# Patient Record
Sex: FEMALE | Race: NATIVE HAWAIIAN OR OTHER PACIFIC ISLANDER | URBAN - NONMETROPOLITAN AREA
[De-identification: names, ages, dates, MRNs, and addresses within clinical notes are randomized per-mention and may not be internally consistent; named-entity substitution may affect disease eponyms.]

---

## 2017-01-13 ENCOUNTER — OFFICE VISIT - GICH (OUTPATIENT)
Dept: FAMILY MEDICINE | Facility: OTHER | Age: 37
End: 2017-01-13

## 2017-01-13 ENCOUNTER — HISTORY (OUTPATIENT)
Dept: FAMILY MEDICINE | Facility: OTHER | Age: 37
End: 2017-01-13

## 2017-01-13 DIAGNOSIS — L74.510 PRIMARY FOCAL HYPERHIDROSIS OF AXILLA: ICD-10-CM

## 2017-01-13 DIAGNOSIS — F41.1 GENERALIZED ANXIETY DISORDER: ICD-10-CM

## 2017-01-13 DIAGNOSIS — Z00.00 ENCOUNTER FOR GENERAL ADULT MEDICAL EXAMINATION WITHOUT ABNORMAL FINDINGS: ICD-10-CM

## 2017-01-13 ASSESSMENT — ANXIETY QUESTIONNAIRES
3. WORRYING TOO MUCH ABOUT DIFFERENT THINGS: NEARLY EVERY DAY
7. FEELING AFRAID AS IF SOMETHING AWFUL MIGHT HAPPEN: MORE THAN HALF THE DAYS
6. BECOMING EASILY ANNOYED OR IRRITABLE: SEVERAL DAYS
2. NOT BEING ABLE TO STOP OR CONTROL WORRYING: NEARLY EVERY DAY
5. BEING SO RESTLESS THAT IT IS HARD TO SIT STILL: NOT AT ALL
4. TROUBLE RELAXING: SEVERAL DAYS
GAD7 TOTAL SCORE: 12
1. FEELING NERVOUS, ANXIOUS, OR ON EDGE: MORE THAN HALF THE DAYS

## 2017-01-15 ENCOUNTER — HISTORY (OUTPATIENT)
Dept: FAMILY MEDICINE | Facility: OTHER | Age: 37
End: 2017-01-15

## 2017-09-19 ENCOUNTER — HISTORY (OUTPATIENT)
Dept: FAMILY MEDICINE | Facility: OTHER | Age: 37
End: 2017-09-19

## 2017-09-19 ENCOUNTER — PRENATAL OFFICE VISIT - GICH (OUTPATIENT)
Dept: FAMILY MEDICINE | Facility: OTHER | Age: 37
End: 2017-09-19

## 2017-09-19 DIAGNOSIS — Z98.891 HISTORY OF UTERINE SCAR DUE TO PREVIOUS SURGERY: ICD-10-CM

## 2017-09-19 DIAGNOSIS — O09.529 SUPERVISION OF ELDERLY MULTIGRAVIDA: ICD-10-CM

## 2017-09-19 LAB
ABO + RH BLD: NORMAL
ABO + RH BLD: NORMAL
ABORH - HISTORICAL: NORMAL
ABSOLUTE BASOPHILS - HISTORICAL: 0 THOU/CU MM
ABSOLUTE EOSINOPHILS - HISTORICAL: 0.2 THOU/CU MM
ABSOLUTE IMMATURE GRANULOCYTES(METAS,MYELOS,PROS) - HISTORICAL: 0 THOU/CU MM
ABSOLUTE LYMPHOCYTES - HISTORICAL: 2.3 THOU/CU MM (ref 0.9–2.9)
ABSOLUTE MONOCYTES - HISTORICAL: 0.8 THOU/CU MM
ABSOLUTE NEUTROPHILS - HISTORICAL: 6.5 THOU/CU MM (ref 1.7–7)
ANTIBODY SCREEN - HISTORICAL: NEGATIVE
BACTERIA URINE: NORMAL BACTERIA/HPF
BASOPHILS # BLD AUTO: 0.4 %
BILIRUB UR QL: NEGATIVE
CLARITY, URINE: CLEAR CLARITY
COLOR UR: YELLOW COLOR
EOSINOPHIL NFR BLD AUTO: 2.2 %
EPITHELIAL CELLS: NORMAL EPI/HPF
ERYTHROCYTE [DISTWIDTH] IN BLOOD BY AUTOMATED COUNT: 14.6 % (ref 11.5–15.5)
GLUCOSE URINE: NEGATIVE MG/DL
HCG UR QL: POSITIVE
HCT VFR BLD AUTO: 40.9 % (ref 33–51)
HEMOGLOBIN: 13.4 G/DL (ref 12–16)
IMMATURE GRANULOCYTES(METAS,MYELOS,PROS) - HISTORICAL: 0.3 %
KETONES UR QL: NEGATIVE MG/DL
LEUKOCYTE ESTERASE URINE: ABNORMAL
LYMPHOCYTES NFR BLD AUTO: 23 % (ref 20–44)
MCH RBC QN AUTO: 26.5 PG (ref 26–34)
MCHC RBC AUTO-ENTMCNC: 32.8 G/DL (ref 32–36)
MCV RBC AUTO: 81 FL (ref 80–100)
MONOCYTES NFR BLD AUTO: 8 %
NEUTROPHILS NFR BLD AUTO: 66.1 % (ref 42–72)
NITRITE UR QL STRIP: NEGATIVE
OCCULT BLOOD,URINE - HISTORICAL: ABNORMAL
PH UR: 7 [PH]
PLATELET # BLD AUTO: 372 THOU/CU MM (ref 140–440)
PMV BLD: 10.4 FL (ref 6.5–11)
PROTEIN QUALITATIVE,URINE - HISTORICAL: NEGATIVE MG/DL
RBC - HISTORICAL: NORMAL /HPF
RED BLOOD COUNT - HISTORICAL: 5.05 MIL/CU MM (ref 4–5.2)
RUBELLA COMMENT - HISTORICAL: NORMAL
SP GR UR STRIP: <=1.005
SPECIMEN EXPIRATION DATE/TIME - HISTORICAL: NORMAL
UROBILINOGEN,QUALITATIVE - HISTORICAL: NORMAL EU/DL
WBC - HISTORICAL: NORMAL /HPF
WHITE BLOOD COUNT - HISTORICAL: 9.9 THOU/CU MM (ref 4.5–11)

## 2017-09-19 ASSESSMENT — ANXIETY QUESTIONNAIRES
2. NOT BEING ABLE TO STOP OR CONTROL WORRYING: MORE THAN HALF THE DAYS
5. BEING SO RESTLESS THAT IT IS HARD TO SIT STILL: MORE THAN HALF THE DAYS
4. TROUBLE RELAXING: MORE THAN HALF THE DAYS
6. BECOMING EASILY ANNOYED OR IRRITABLE: MORE THAN HALF THE DAYS
GAD7 TOTAL SCORE: 12
1. FEELING NERVOUS, ANXIOUS, OR ON EDGE: SEVERAL DAYS
7. FEELING AFRAID AS IF SOMETHING AWFUL MIGHT HAPPEN: SEVERAL DAYS
3. WORRYING TOO MUCH ABOUT DIFFERENT THINGS: MORE THAN HALF THE DAYS

## 2017-09-19 ASSESSMENT — PATIENT HEALTH QUESTIONNAIRE - PHQ9: SUM OF ALL RESPONSES TO PHQ QUESTIONS 1-9: 3

## 2017-09-20 ENCOUNTER — COMMUNICATION - GICH (OUTPATIENT)
Dept: FAMILY MEDICINE | Facility: OTHER | Age: 37
End: 2017-09-20

## 2017-09-20 DIAGNOSIS — Z33.1 PREGNANT STATE, INCIDENTAL: ICD-10-CM

## 2017-09-20 LAB
HBSAG CATEGORY - HISTORICAL: NONREACTIVE
HIV-1/HIV-2 ANTIBODY CATEGORY - HISTORICAL: NORMAL

## 2017-09-21 LAB
CULTURE - HISTORICAL: NORMAL
TREPONEMA PALLIDUM - HISTORICAL: NEGATIVE

## 2017-09-25 ENCOUNTER — HOSPITAL ENCOUNTER (OUTPATIENT)
Dept: RADIOLOGY | Facility: OTHER | Age: 37
End: 2017-09-25
Attending: FAMILY MEDICINE

## 2017-09-25 ENCOUNTER — AMBULATORY - GICH (OUTPATIENT)
Dept: FAMILY MEDICINE | Facility: OTHER | Age: 37
End: 2017-09-25

## 2017-09-25 DIAGNOSIS — Z98.891 HISTORY OF UTERINE SCAR DUE TO PREVIOUS SURGERY: ICD-10-CM

## 2017-09-25 DIAGNOSIS — O09.529 SUPERVISION OF ELDERLY MULTIGRAVIDA: ICD-10-CM

## 2017-09-26 ENCOUNTER — HISTORY (OUTPATIENT)
Dept: FAMILY MEDICINE | Facility: OTHER | Age: 37
End: 2017-09-26

## 2017-09-26 ENCOUNTER — AMBULATORY - GICH (OUTPATIENT)
Dept: FAMILY MEDICINE | Facility: OTHER | Age: 37
End: 2017-09-26

## 2017-09-26 DIAGNOSIS — O30.049 DICHORIONIC DIAMNIOTIC TWIN PREGNANCY: ICD-10-CM

## 2017-09-27 ENCOUNTER — AMBULATORY - GICH (OUTPATIENT)
Dept: FAMILY MEDICINE | Facility: OTHER | Age: 37
End: 2017-09-27

## 2017-09-27 DIAGNOSIS — O09.529 SUPERVISION OF ELDERLY MULTIGRAVIDA: ICD-10-CM

## 2017-09-27 DIAGNOSIS — O30.041 DICHORIONIC DIAMNIOTIC TWIN PREGNANCY IN FIRST TRIMESTER: ICD-10-CM

## 2017-10-09 ENCOUNTER — HISTORY (OUTPATIENT)
Dept: FAMILY MEDICINE | Facility: OTHER | Age: 37
End: 2017-10-09

## 2017-10-09 ENCOUNTER — PRENATAL OFFICE VISIT - GICH (OUTPATIENT)
Dept: FAMILY MEDICINE | Facility: OTHER | Age: 37
End: 2017-10-09

## 2017-10-09 DIAGNOSIS — Z98.891 HISTORY OF UTERINE SCAR DUE TO PREVIOUS SURGERY: ICD-10-CM

## 2017-10-09 DIAGNOSIS — L74.510 PRIMARY FOCAL HYPERHIDROSIS OF AXILLA: ICD-10-CM

## 2017-10-09 DIAGNOSIS — O09.529 SUPERVISION OF ELDERLY MULTIGRAVIDA: ICD-10-CM

## 2017-10-09 DIAGNOSIS — O30.041 DICHORIONIC DIAMNIOTIC TWIN PREGNANCY IN FIRST TRIMESTER: ICD-10-CM

## 2017-10-20 ENCOUNTER — PRENATAL OFFICE VISIT - GICH (OUTPATIENT)
Dept: OBGYN | Facility: OTHER | Age: 37
End: 2017-10-20

## 2017-10-20 ENCOUNTER — HISTORY (OUTPATIENT)
Dept: OBGYN | Facility: OTHER | Age: 37
End: 2017-10-20

## 2017-10-20 DIAGNOSIS — O09.529 SUPERVISION OF ELDERLY MULTIGRAVIDA: ICD-10-CM

## 2017-10-20 DIAGNOSIS — O30.041 DICHORIONIC DIAMNIOTIC TWIN PREGNANCY IN FIRST TRIMESTER: ICD-10-CM

## 2017-10-20 ASSESSMENT — PATIENT HEALTH QUESTIONNAIRE - PHQ9: SUM OF ALL RESPONSES TO PHQ QUESTIONS 1-9: 4

## 2017-11-21 ENCOUNTER — HISTORY (OUTPATIENT)
Dept: OBGYN | Facility: OTHER | Age: 37
End: 2017-11-21

## 2017-11-21 ENCOUNTER — PRENATAL OFFICE VISIT - GICH (OUTPATIENT)
Dept: OBGYN | Facility: OTHER | Age: 37
End: 2017-11-21

## 2017-11-21 DIAGNOSIS — O30.042 DICHORIONIC DIAMNIOTIC TWIN PREGNANCY IN SECOND TRIMESTER: ICD-10-CM

## 2017-11-21 DIAGNOSIS — Z98.891 HISTORY OF UTERINE SCAR DUE TO PREVIOUS SURGERY: ICD-10-CM

## 2017-11-21 DIAGNOSIS — O09.529 SUPERVISION OF ELDERLY MULTIGRAVIDA: ICD-10-CM

## 2017-12-27 NOTE — PROGRESS NOTES
Patient Information     Patient Name MRN Sex Dafne Wright [id number] Female 1980      Progress Notes by Tu Mejia MD at 2017  3:15 PM     Author:  Tu Mejia MD Service:  (none) Author Type:  Physician     Filed:  2017  4:49 PM Encounter Date:  2017 Status:  Signed     :  Tu Mejia MD (Physician)            [facility]    Problem List:  Estimated Date of Delivery: 18  Hx of LTCS for fetal intolerance of labor  Twin gestation, (di/di)  Clinically significant anxiety    OB History                    Para  Term     AB  Living     2   1  1       1     SAB   TAB  Ectopic  Multiple   Live Births                 1                        # Outcome Date  GA  Lbr Frank/2nd  Weight Sex  Delivery Anes PTL Lv   2 Current                1 Term 16  40w0d    3.51 kg (7 lb 11.8 oz) M   SPINAL  DANNY      Complications: Fetal Intolerance                                    Immunization History     Administered  Date(s) Administered     AMB Influenza, IIV4 PF (=>6 mos Flulaval;=>3 yrs Fluzone Fluarix)(Flu Clinic Only) 2016     Influenza, IIV4 2015, 2017     Tdap 2016       O Rh Positive  28 week labs:*  Tdap*  Flu:done  GBS:*    S:  NO fetal movement yet, moods OK, some bloating, BM is OK.    O: /82  Pulse 78  Wt 75.6 kg (166 lb 9.6 oz)  LMP 2017  BMI 27.72 kg/m2  See flowsheet    A/P:  Dafne Altman is a 37 y.o.  at 16w4d early , here for return OB visit  Fetal survey and follow up in one month.  Anxiety and impression is better/stable.    RTC one month.

## 2017-12-27 NOTE — PROGRESS NOTES
Patient Information     Patient Name MRN Sex Dafne Wright [id number] Female 1980      Progress Notes by Milagro Sibley MD at 10/9/2017  3:00 PM     Author:  Milagro Sibley MD Service:  (none) Author Type:  Physician     Filed:  10/9/2017  5:24 PM Encounter Date:  10/9/2017 Status:  Signed     :  Milagro Sibley MD (Physician)            Pt with twin gestation.  Discussion regarding pregnancy management with OBGYN.  She will be scheduling with Tu Mejia MD.    She denies nausea.  Has had some intermittent constipation.  Is irritable, at times furious, more frequent crying.    Flu vaccine done at last visit.  Pt with previous , request tubal ligation.  Had declined AMA testing.  No bleeding/spotting or discharge.  O Rh Positive  HEMOGLOBIN                (g/dL)    Date Value   2017 13.4   May need follow up ultrasound to reconfirm viable twins - at least one heart beat audible today.  Milagro Sibley MD

## 2017-12-27 NOTE — PROGRESS NOTES
"Patient Information     Patient Name MRN Sex     Dafne Altman [id number] Female 1980      Progress Notes by Tu Mejia MD at 10/20/2017  3:00 PM     Author:  Tu Mejia MD Service:  (none) Author Type:  Physician     Filed:  10/20/2017  4:59 PM Encounter Date:  10/20/2017 Status:  Signed     :  Tu Mejia MD (Physician)            [facility]  OB Consult requested by Dr. Sibley    Problem List:  Estimated Date of Delivery: 18  Hx of LTCS for fetal intolerance of labor  Twin gestation, (di/di)  Clinically significant anxiety    OB History                    Para  Term     AB  Living     2   1  1       1     SAB   TAB  Ectopic  Multiple   Live Births                 1                        # Outcome Date  GA  Lbr Frank/2nd  Weight Sex  Delivery Anes PTL Lv   2 Current                1 Term 16  40w0d    3.51 kg (7 lb 11.8 oz) M   SPINAL  DANNY      Complications: Fetal Intolerance                                    Immunization History     Administered  Date(s) Administered     AMB Influenza, IIV4 PF (=>6 mos Flulaval;=>3 yrs Fluzone Fluarix)(Flu Clinic Only) 2016     Influenza, IIV4 2015, 2017     Tdap 2016       O Rh Positive  28 week labs:*  Tdap*  Flu:done  GBS:*    S: Patient reports she has been feeling sad, anxious and depressed. She states she doesn't like pregnancy or motherhood and feels overwhelmed. She declines anxiety or depression meds today and denies suicidality. She denies cramping, contractions, vaginal bleeding, leaking fluid. She reports no normal fetal movement yet. She has no other complaints.    O: /80  Pulse 74  Ht 1.651 m (5' 5\")  Wt 71.7 kg (158 lb)  LMP 2017  BMI 26.29 kg/m2  See flowsheet    Bedside US demonstrates a viable twin IUP, dichorionic and diamnionic measruing 11w6d by CRL on both Twin A and B. Both fetal heart rates in the 160's.    A/P:  Dafne Altman is a 37 y.o. "  at 12w0d by early US, here for return OB visit/transfer of care to OB.    RTC one month.  Discussed risks of twin IUP compared to [facility] including  birth, hypertension, DM in pregnancy, and poor  outcome. Will plan for 37-38 week repeat . She is requesting tubal ligation at the time of her . Will consent for this around 32 weeks. Discussed weight gain, and potentially starting an antidepressant. Discussed supplementing additional folic acid at 1-2 mg daily, and ferrous sulfate 325 mg daily in addition to her PNV. She is declining aneuploidy screening, CF, and SMA screening. Plan for monthly growth US from 20 weeks and beyond. Discussed the nature of our group practice and that she will need to be managed in OB/Gyn with myself or Riccardo Hutchinson or Juan M. She will Dr. Sibley for her twins as the  doctor.  Tu Mejia MD FACOG  4:50 PM 10/20/2017

## 2017-12-28 NOTE — TELEPHONE ENCOUNTER
Patient Information     Patient Name MRN Sex Dafne Wright [id number] Female 1980      Telephone Encounter by Milagro Sibley MD at 2017 12:40 PM     Author:  Milagro Sibley MD Service:  (none) Author Type:  Physician     Filed:  2017 12:46 PM Encounter Date:  2017 Status:  Signed     :  Milagro Sibley MD (Physician)            Discussed OBGYN consult due to twin gestation.  Pt is aware.  Has appointment with Milagro Sibley MD early next month.  Milagro Sibley MD

## 2017-12-28 NOTE — TELEPHONE ENCOUNTER
Patient Information     Patient Name MRN Dafne Bates [id number] Female 1980      Telephone Encounter by Mayra Paulson at 2017 10:37 AM     Author:  Mayra Paulson Service:  (none) Author Type:  (none)     Filed:  2017 10:40 AM Encounter Date:  2017 Status:  Signed     :  Mayra Paulson            Patient is wondering if she could get a prescription for prenatal vitamins sent to pharmacy. Previous prescription for vitamins is ryan'd up below.      Mayra Paulson ....................  2017   10:39 AM

## 2017-12-30 NOTE — NURSING NOTE
Patient Information     Patient Name MRN Dafne Bates [id number] Female 1980      Nursing Note by Mayra Paulson at 10/9/2017  3:00 PM     Author:  Mayra Paulson Service:  (none) Author Type:  (none)     Filed:  10/9/2017  3:13 PM Encounter Date:  10/9/2017 Status:  Signed     :  Mayra Paulson            Patient presents to the clinic today for a OB check, she is 10w3d.    Mayra Paulson ....................  10/9/2017   2:58 PM

## 2017-12-30 NOTE — NURSING NOTE
Patient Information     Patient Name MRN Dafne Bates [id number] Female 1980      Nursing Note by Mayra Paulson at 2017  2:45 PM     Author:  Mayra Paulson Service:  (none) Author Type:  (none)     Filed:  2017  3:00 PM Encounter Date:  2017 Status:  Signed     :  Mayra Paulson            Patient presents to the clinic today for an OB px.    Mayra Paulson ....................  2017   2:47 PM

## 2018-01-02 ENCOUNTER — HISTORY (OUTPATIENT)
Dept: OBGYN | Facility: OTHER | Age: 38
End: 2018-01-02

## 2018-01-02 ENCOUNTER — PRENATAL OFFICE VISIT - GICH (OUTPATIENT)
Dept: OBGYN | Facility: OTHER | Age: 38
End: 2018-01-02

## 2018-01-02 ENCOUNTER — HOSPITAL ENCOUNTER (OUTPATIENT)
Dept: RADIOLOGY | Facility: OTHER | Age: 38
End: 2018-01-02
Attending: OBSTETRICS & GYNECOLOGY

## 2018-01-02 DIAGNOSIS — L74.510 PRIMARY FOCAL HYPERHIDROSIS OF AXILLA: ICD-10-CM

## 2018-01-02 DIAGNOSIS — O30.042 DICHORIONIC DIAMNIOTIC TWIN PREGNANCY IN SECOND TRIMESTER: ICD-10-CM

## 2018-01-02 DIAGNOSIS — O09.529 SUPERVISION OF ELDERLY MULTIGRAVIDA: ICD-10-CM

## 2018-01-03 NOTE — PROGRESS NOTES
Patient Information     Patient Name MRN Dafne Bates [id number] Female 1980      Progress Notes by Milagro Sibley MD at 2017  3:00 PM     Author:  Milagro Sibley MD Service:  (none) Author Type:  Physician     Filed:  1/15/2017 10:38 AM Encounter Date:  2017 Status:  Signed     :  Milagro Sibley MD (Physician)            SUBJECTIVE:    Dafne Altman is a 36 y.o. female who presehts for her annual exam.    HPI: Dafne Altman is a 36 y.o. female presents for her annual exam.    Last pap:   Immunizations:  utd  Mammogram at age 44  Colon cancer screening at age 49  Current birth control stopped her oral contraceptive pills  - states not having sex often.  Sleeps in son's room.  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     PAST MEDICAL HISTORY:  Past Medical History      Diagnosis   Date     Hyperhidrosis of axilla  2017     Postpartum anxiety  5/15/2016     PPD positive, treated       Pregnancy, high-risk             SURGICAL HISTORY:  Past Surgical History       Procedure   Laterality Date     Cystectomy  Left      Axillary area       Colonoscopy         section, low transverse   3/29/2016     recurrent decels         SOCIAL HISTORY:  Social History     Social History        Marital status:       Spouse name: González     Number of children:  1     Years of education:  16     Occupational History          [employer]     works remotely      Social History Main Topics          Smoking status:   Never Smoker      Smokeless tobacco:   Never Used      Alcohol use   2.4 oz/week     4 Standard drinks or equivalent per week        Comment:  often in single episode       Drug use:   No      Sexual activity:   Yes      Partners:  Male      Birth control/ protection:  None      Other Topics  Concern     Not on file      Social History Narrative     She was born in the [place] and has lived in the states for 23 years.         She works for [employer] from home. She works with the [employer].   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        Son González Chaudhary born 3/2016          .      FAMILY HISTORY:  Family History       Problem   Relation Age of Onset     Diabetes  Mother      Hypertension  Father      Other  Sister      PCOS       Diabetes  Sister      Diabetes  Sister       CURRENT MEDICATIONS:  None   Current Outpatient Prescriptions       Medication  Sig Dispense Refill             No current facility-administered medications for this visit.      Medications have been reviewed by me and are current to the best of my knowledge and ability.    ALLERGIES:  Review of patient's allergies indicates no known allergies.     REVIEW OF SYSTEMS:  General: stressed  Eyes: denies problems  Ears/Nose/Throat: denies problems, last dentist visit was last summer  Respiratory: denies problems  Cardiovascular: denies problems  Gastrointestinal: denies problems  Genitourinary: off of oral contraceptive pills  - states not having sex often  Musculoskeletal: denies problems  Skin: states at the start of her work day she immediately starts sweating profusely - this has been for years.  This is embarrassing, even though she works at home.  Neurologic: denies problems  Psychiatric: she's had anxiety for years; while working she sweats, chews the inside of her cheek; doesn't feel depressed;  works long and extra shifts - she doesn't feel like she has lots of support; finding reliable  has been a struggle; doesn't get regular time to exercise  In the past she's been on Buspar, Effexor,  "and Wellbutrin - also said she took chantix once and had a \"psyhotic episode\"; was on celexa - didn't feel as if it helped much and it made her want to eat all the time.  She and  are not communicating well.  She is drinking more often - intentionally to numb her feelings - this is about once a week.  Endocrine: denies problems; previously negative thyroid testing.  Heme/Lymphatic: denies problems  Allergic/Immunologic: denies problems    PHQ Depression Screening 1/13/2017   Date of PHQ exam (doc flow) 1/13/2017   1. Lack of interest/pleasure 0 - Not at all   2. Feeling down/depressed 0 - Not at all   PHQ-2 TOTAL SCORE 0   3. Trouble sleeping -   4. Decreased energy -   5. Appetite change -   6. Feelings of failure -   7. Trouble concentrating -   8. Activity level -   9. Hurting yourself -   PHQ-9 TOTAL SCORE -   PHQ-9 Severity Level -   Functional Impairment -      RITCHIE-7 ANXIETY SCREENING 1/13/2017   RITCHIE date (doc flow) 1/13/2017   Nervous, anxious 2   Cannot stop worrying 3   Worry about different things 3   Cannot relax 1   Feeling restless 0   Easily annoyed/irritated 1   Afraid of awful event 2   Score 12   Severity moderate anxiety       OBJECTIVE:  /80  Pulse 72  Ht 1.651 m (5' 5\")  Wt 69.9 kg (154 lb)  LMP 12/27/2016  BMI 25.63 kg/m2   Wt Readings from Last 3 Encounters:    01/13/17 69.9 kg (154 lb)   05/10/16 68.5 kg (151 lb)   04/12/16 70.5 kg (155 lb 6.4 oz)       EXAM:   General Appearance: Pleasant, alert, appropriate appearance for age. No acute distress  Head Exam: Normal. Normocephalic, atraumatic.  Eye Exam:  Orbital prominence, unchanged  Ear Exam: Normal TM's bilaterally. Normal auditory canals and external ears. Non-tender.  Nose Exam: Normal external nose, mucus membranes, and septum.  OroPharynx Exam:  Dental hygiene adequate. Normal buccal mucose. Normal pharynx.  Neck Exam:  Supple, no masses or nodes.  Thyroid Exam: No nodules or enlargement.  Chest/Respiratory Exam: Normal " chest wall and respirations. Clear to auscultation.  Breast Exam: No dimpling, nipple retraction or discharge. No masses or nodes.  Cardiovascular Exam: Regular rate and rhythm. S1, S2, no murmur, click, gallop, or rubs.  Gastrointestinal Exam: Soft, non-tender, no masses or organomegaly  Musculoskeletal Exam: Back is straight and non-tender, full ROM of upper and lower extremities.  Foot Exam: Left and right foot: good pedal pulses, no lesions, nail hygiene good.  Skin: no rash or abnormalities  Neurologic Exam: Nonfocal, symmetric DTRs, normal gross motor, tone coordination and no tremor.  Psychiatric Exam: tearful, tense    No results found for this visit on 01/13/17.    ASSESSMENT/PLAN    ICD-10-CM    1. Physical exam, annual Z00.00    2. 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 Generalized anxiety disorder F41.1      Ms. Dons Body mass index is 25.63 kg/(m^2). This is out of the normal range for a 36 y.o. Normal range for ages 18-64 is between 18.5 and 24.9; normal range for ages 65+ is 23-30. To lose weight we reviewed risks and benefits of appropriate options such as diet, exercise, and medications. Patient's strategy will be  none; patient is not ready to act  BP Readings from Last 1 Encounters:01/13/17 : 134/80  Ms. Carias blood pressure is out of the normal range for adults. Per JNC-8 guidelines normal adult blood pressure is < 120/80, pre-hypertensive is between 120/80 and 139/89, and hypertension is 140/90 or greater. Risks of hypertension were discussed. Patient's strategy will be to recheck blood pressure in 12 months    1.  Immunizations and HCM are up to date.  2.  Prescription for drysol to help with sweating.  3.  Discussion regarding mood and anxiety symptoms.  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  Couples counseling is  strongly recommended at this time.  I also recommended no alcohol use as she is currently using this in a potentially harmful manner.  She declines medication for anxiety at this time - I feel that some home dynamic changes would be more beneficial to medication treatment too.    Encouraged follow up in 2-3 months.  Milagro Sibley MD

## 2018-01-03 NOTE — NURSING NOTE
Patient Information     Patient Name MRN Dafne Bates [id number] Female 1980      Nursing Note by Heather Rojas at 2017  3:00 PM     Author:  Heather Rojas Service:  (none) Author Type:  (none)     Filed:  2017  3:33 PM Encounter Date:  2017 Status:  Signed     :  Heather Rojas            Patient here for yearly physical  Heather Rojsa LPN..............................2017  3:10 PM

## 2018-01-23 ENCOUNTER — COMMUNICATION - GICH (OUTPATIENT)
Dept: OBGYN | Facility: OTHER | Age: 38
End: 2018-01-23

## 2018-01-26 VITALS
HEART RATE: 78 BPM | SYSTOLIC BLOOD PRESSURE: 132 MMHG | DIASTOLIC BLOOD PRESSURE: 82 MMHG | WEIGHT: 166.6 LBS | BODY MASS INDEX: 27.72 KG/M2

## 2018-01-26 VITALS
DIASTOLIC BLOOD PRESSURE: 80 MMHG | HEART RATE: 76 BPM | BODY MASS INDEX: 26.02 KG/M2 | HEIGHT: 65 IN | SYSTOLIC BLOOD PRESSURE: 122 MMHG | WEIGHT: 157 LBS | WEIGHT: 156.2 LBS | HEIGHT: 65 IN | DIASTOLIC BLOOD PRESSURE: 76 MMHG | SYSTOLIC BLOOD PRESSURE: 126 MMHG | BODY MASS INDEX: 26.16 KG/M2

## 2018-01-26 VITALS
WEIGHT: 158 LBS | WEIGHT: 154 LBS | HEIGHT: 65 IN | SYSTOLIC BLOOD PRESSURE: 128 MMHG | SYSTOLIC BLOOD PRESSURE: 134 MMHG | HEIGHT: 65 IN | DIASTOLIC BLOOD PRESSURE: 80 MMHG | BODY MASS INDEX: 25.66 KG/M2 | HEART RATE: 74 BPM | HEART RATE: 72 BPM | DIASTOLIC BLOOD PRESSURE: 80 MMHG | BODY MASS INDEX: 26.33 KG/M2

## 2018-01-30 ENCOUNTER — PRENATAL OFFICE VISIT - GICH (OUTPATIENT)
Dept: OBGYN | Facility: OTHER | Age: 38
End: 2018-01-30

## 2018-01-30 ENCOUNTER — HISTORY (OUTPATIENT)
Dept: OBGYN | Facility: OTHER | Age: 38
End: 2018-01-30

## 2018-01-30 ENCOUNTER — HOSPITAL ENCOUNTER (OUTPATIENT)
Dept: RADIOLOGY | Facility: OTHER | Age: 38
End: 2018-01-30
Attending: OBSTETRICS & GYNECOLOGY

## 2018-01-30 DIAGNOSIS — O30.042 DICHORIONIC DIAMNIOTIC TWIN PREGNANCY IN SECOND TRIMESTER: ICD-10-CM

## 2018-01-30 DIAGNOSIS — Z34.92 ENCOUNTER FOR SUPERVISION OF NORMAL PREGNANCY IN SECOND TRIMESTER: ICD-10-CM

## 2018-01-31 ASSESSMENT — ANXIETY QUESTIONNAIRES
GAD7 TOTAL SCORE: 12
GAD7 TOTAL SCORE: 12

## 2018-01-31 ASSESSMENT — PATIENT HEALTH QUESTIONNAIRE - PHQ9
SUM OF ALL RESPONSES TO PHQ QUESTIONS 1-9: 4
SUM OF ALL RESPONSES TO PHQ QUESTIONS 1-9: 3

## 2018-02-04 ENCOUNTER — HEALTH MAINTENANCE LETTER (OUTPATIENT)
Age: 38
End: 2018-02-04

## 2018-02-07 ENCOUNTER — DOCUMENTATION ONLY (OUTPATIENT)
Dept: FAMILY MEDICINE | Facility: OTHER | Age: 38
End: 2018-02-07

## 2018-02-07 PROBLEM — L74.510 HYPERHIDROSIS OF AXILLA: Status: ACTIVE | Noted: 2017-01-13

## 2018-02-07 PROBLEM — O09.529 HIGH-RISK PREGNANCY, MULTIGRAVIDA OF ADVANCED MATERNAL AGE, ANTEPARTUM: Status: ACTIVE | Noted: 2017-09-19

## 2018-02-07 PROBLEM — O30.049 TWIN GESTATION, DICHORIONIC DIAMNIOTIC: Status: ACTIVE | Noted: 2017-01-01

## 2018-02-07 PROBLEM — Z98.891 HISTORY OF CESAREAN SECTION: Status: ACTIVE | Noted: 2017-09-19

## 2018-02-07 RX ORDER — FERROUS SULFATE 325(65) MG
1 TABLET, DELAYED RELEASE (ENTERIC COATED) ORAL DAILY
COMMUNITY
Start: 2018-01-02

## 2018-02-07 RX ORDER — FOLIC ACID 1 MG/1
1 TABLET ORAL DAILY
COMMUNITY
Start: 2018-01-02

## 2018-02-07 RX ORDER — PRENATAL VIT/IRON FUM/FOLIC AC 27MG-0.8MG
1 TABLET ORAL DAILY
COMMUNITY
Start: 2017-09-20

## 2018-02-08 DIAGNOSIS — O09.529 HIGH-RISK PREGNANCY, MULTIGRAVIDA OF ADVANCED MATERNAL AGE, ANTEPARTUM: Primary | ICD-10-CM

## 2018-02-08 DIAGNOSIS — O09.529 SUPERVISION OF HIGH-RISK PREGNANCY OF ELDERLY MULTIGRAVIDA: Primary | ICD-10-CM

## 2018-02-09 VITALS
WEIGHT: 173.2 LBS | SYSTOLIC BLOOD PRESSURE: 140 MMHG | BODY MASS INDEX: 28.82 KG/M2 | DIASTOLIC BLOOD PRESSURE: 70 MMHG | HEART RATE: 72 BPM

## 2018-02-09 VITALS
HEART RATE: 88 BPM | BODY MASS INDEX: 28.46 KG/M2 | WEIGHT: 171 LBS | DIASTOLIC BLOOD PRESSURE: 68 MMHG | SYSTOLIC BLOOD PRESSURE: 128 MMHG

## 2018-02-12 ENCOUNTER — PRENATAL OFFICE VISIT (OUTPATIENT)
Dept: OBGYN | Facility: OTHER | Age: 38
End: 2018-02-12
Attending: OBSTETRICS & GYNECOLOGY
Payer: COMMERCIAL

## 2018-02-12 ENCOUNTER — HOSPITAL ENCOUNTER (OUTPATIENT)
Dept: LAB | Facility: OTHER | Age: 38
Discharge: HOME | End: 2018-02-12
Attending: OBSTETRICS & GYNECOLOGY | Admitting: OBSTETRICS & GYNECOLOGY
Payer: COMMERCIAL

## 2018-02-12 VITALS
HEIGHT: 65 IN | BODY MASS INDEX: 29.26 KG/M2 | DIASTOLIC BLOOD PRESSURE: 70 MMHG | SYSTOLIC BLOOD PRESSURE: 120 MMHG | HEART RATE: 80 BPM | WEIGHT: 175.6 LBS

## 2018-02-12 DIAGNOSIS — O09.529 HIGH-RISK PREGNANCY, MULTIGRAVIDA OF ADVANCED MATERNAL AGE, ANTEPARTUM: ICD-10-CM

## 2018-02-12 DIAGNOSIS — O09.529 SUPERVISION OF HIGH-RISK PREGNANCY OF ELDERLY MULTIGRAVIDA: ICD-10-CM

## 2018-02-12 DIAGNOSIS — O09.529 SUPERVISION OF HIGH-RISK PREGNANCY OF ELDERLY MULTIGRAVIDA: Primary | ICD-10-CM

## 2018-02-12 LAB
GLUCOSE 1H P 50 G GLC PO SERPL-MCNC: 129 MG/DL (ref 60–129)
HGB BLD-MCNC: 10.9 G/DL (ref 11.7–15.7)

## 2018-02-12 PROCEDURE — 36415 COLL VENOUS BLD VENIPUNCTURE: CPT | Performed by: OBSTETRICS & GYNECOLOGY

## 2018-02-12 PROCEDURE — 82950 GLUCOSE TEST: CPT | Performed by: OBSTETRICS & GYNECOLOGY

## 2018-02-12 PROCEDURE — 99207 ZZC PRENATAL VISIT: CPT | Performed by: OBSTETRICS & GYNECOLOGY

## 2018-02-12 PROCEDURE — 85018 HEMOGLOBIN: CPT | Performed by: OBSTETRICS & GYNECOLOGY

## 2018-02-12 ASSESSMENT — PAIN SCALES - GENERAL: PAINLEVEL: NO PAIN (0)

## 2018-02-12 NOTE — MR AVS SNAPSHOT
After Visit Summary   2/12/2018    Dafne Altman    MRN: [id number]           Patient Information     Date Of Birth          1980        Visit Information        Provider Department      2/12/2018 2:45 PM Ismael Mcgowan MD [facility]        Today's Diagnoses     Supervision of high-risk pregnancy of elderly multigravida    -  1       Follow-ups after your visit        Follow-up notes from your care team     Return in about 2 weeks (around 2/26/2018).      Your next 10 appointments already scheduled     Feb 28, 2018  2:30 PM CST   US OB TRANSVAGINAL ONLY with GHUS[facility]   [facility]    [address]  [place] [address] [phone]           Please bring a list of your medicines (including vitamins, minerals and over-the-counter drugs). Also, tell your doctor about any allergies you may have. Wear comfortable clothes and leave your valuables at home.  If you re less than 20 weeks drink four 8-ounce glasses of fluid an hour before your exam. If you need to empty your bladder before your exam, try to release only a little urine. Then, drink another glass of fluid.  You may have up to two family members in the exam room. If you bring a small child, an adult must be there to care for him or her.  Please call the Imaging Department at your exam site with any questions.              Who to contact     If you have questions or need follow up information about today's clinic visit or your schedule please contact [facility] directly at [phone].  Normal or non-critical lab and imaging results will be communicated to you by MyChart, letter or phone within 4 business days after the clinic has received the results. If you do not hear from us within 7 days, please contact the clinic through MyChart or phone. If you have a critical or abnormal lab result, we will notify you by phone as soon as  "possible.  Submit refill requests through [employer] or call your pharmacy and they will forward the refill request to us. Please allow 3 business days for your refill to be completed.          Additional Information About Your Visit        [employer] Information     [employer] lets you send messages to your doctor, view your test results, renew your prescriptions, schedule appointments and more. To sign up, go to [email or web address] . Click on \"Log in\" on the left side of the screen, which will take you to the Welcome page. Then click on \"Sign up Now\" on the right side of the page.     You will be asked to enter the access code listed below, as well as some personal information. Please follow the directions to create your username and password.     Your access code is: [email or web address]  Expires: 2018  7:57 AM     Your access code will  in 90 days. If you need help or a new code, please call your [facility] clinic or [phone].        Care EveryWhere ID     This is your Care EveryWhere ID. This could be used by other organizations to access your [facility] medical records  [id number]        Your Vitals Were     Pulse Height Last Period BMI (Body Mass Index)          80 1.651 m (5' 5\") 2017 (LMP Unknown) 29.22 kg/m2         Blood Pressure from Last 3 Encounters:   18 120/70   18 128/68   18 140/70    Weight from Last 3 Encounters:   18 79.7 kg (175 lb 9.6 oz)   18 77.6 kg (171 lb)   18 78.6 kg (173 lb 3.2 oz)              We Performed the Following     ABO and Rh        Primary Care Provider Office Phone # Fax #    Milagro Robbins [phone] [address] [facility] [address]        Equal Access to Services     [facility] Center[facility] : Alida Do, sanket flanagan, armen pham . [facility] [phone].    ATENCIÓN: Si jimmie rock, tiene a alfonso disposición servicios " brigette de asistencia lingüística. Alie lambert [phone].    We comply with applicable federal civil rights laws and [place] laws. We do not discriminate on the basis of race, color, national origin, age, disability, sex, sexual orientation, or gender identity.            Thank you!     Thank you for choosing [facility] AND [facility]  for your care. Our goal is always to provide you with excellent care. Hearing back from our patients is one way we can continue to improve our services. Please take a few minutes to complete the written survey that you may receive in the mail after your visit with us. Thank you!             Your Updated Medication List - Protect others around you: Learn how to safely use, store and throw away your medicines at www.disposemymeds.org.          This list is accurate as of 2/12/18 11:59 PM.  Always use your most recent med list.                   Brand Name Dispense Instructions for use Diagnosis    aluminum chloride 20 % external solution    DRYSOL     Apply topically daily as needed        ferrous sulfate 325 (65 FE) MG Tbec EC tablet      Take 1 tablet by mouth daily        folic acid 1 MG tablet    FOLVITE     Take 1 tablet by mouth daily        prenatal multivitamin plus iron 27-0.8 MG Tabs per tablet      Take 1 tablet by mouth daily

## 2018-02-12 NOTE — NURSING NOTE
Patient Information     Patient Name MRN Dafne Bates [id number] Female 1980      Nursing Note by Agnes Patel at 2018  4:00 PM     Author:  Agnes Patel Service:  (none) Author Type:  (none)     Filed:  2018  4:08 PM Encounter Date:  2018 Status:  Signed     :  Agnes Patel            Patient is here today for a OB check she is 22w 4 d.  Agnes Patel LPN.......................... 2018  4:05 PM

## 2018-02-12 NOTE — PROGRESS NOTES
"  Past Medical History:   Diagnosis Date     Dichorionic diamniotic twin pregnancy     2017,     Nonspecific reaction to tuberculin skin test without active tuberculosis     No Comments Provided     Other mental disorders complicating the puerperium     5/15/2016     Primary focal hyperhidrosis of axilla     2017     SUBJECTIVE:  Doing ok, babies are active.  Denies bleeding, cramping or SROM    OBJECTIVE:  /70 (BP Location: Right arm, Patient Position: Sitting, Cuff Size: Adult Large)  Pulse 80  Ht 1.651 m (5' 5\")  Wt 79.7 kg (175 lb 9.6 oz)  LMP 2017 (LMP Unknown)  BMI 29.22 kg/m2      ASSESSMENT/PLAN:  1. AMA, declined quad screen & [place]  2. MBT = O pos  3. Previous  delivery, plans repeat  4.   5. Di/Di twin pregnancy  6. Rubella immune    EDC 2018  GA 28w 3d    (O09.529) Supervision of high-risk pregnancy of elderly multigravida  (primary encounter diagnosis)  Comment: doing ok, no complaints  Plan: CANCELED: ABO and Rh, OGTT today        F/U in 2 weeks with Dr Mejia        "

## 2018-02-12 NOTE — PROGRESS NOTES
Patient Information     Patient Name MRN Sex Dafne Wright [id number] Female 1980      Progress Notes by Tu Mejia MD at 2018  4:00 PM     Author:  Tu Mejia MD Service:  (none) Author Type:  Physician     Filed:  2018  4:34 PM Encounter Date:  2018 Status:  Signed     :  Tu Mejia MD (Physician)            [facility]    Problem List:  Estimated Date of Delivery: 18  Hx of LTCS for fetal intolerance of labor  Twin gestation, (di/di)  Clinically significant anxiety    OB History                    Para  Term     AB  Living     2   1  1       1     SAB   TAB  Ectopic  Multiple   Live Births                 1                        # Outcome Date  GA  Lbr Frank/2nd  Weight Sex  Delivery Anes PTL Lv   2 Current                1 Term 16  40w0d    3.51 kg (7 lb 11.8 oz) M   SPINAL  DANNY      Complications: Fetal Intolerance                                    Immunization History     Administered  Date(s) Administered     AMB Influenza, IIV4 PF (=>6 mos Flulaval;=>3 yrs Fluzone Fluarix)(Flu Clinic Only) 2016     Influenza, IIV4 2015, 2017     Tdap 2016       O Rh Positive  28 week labs:*  Tdap*  Flu:done  GBS:*    S:  Active babies, US today, read pending. No new concerns.    O: /70 (Cuff Site: Right Arm, Position: Sitting, Cuff Size: Adult Regular)  Pulse 72  Wt 78.6 kg (173 lb 3.2 oz)  LMP 2017  BMI 28.82 kg/m2  See flowsheet    A/P:  Dafne Altman is a 37 y.o.  at 22w4d by early US, here for return OB visit  Monthly growth US.  Anxiety and impression is better/stable.    RTC one month.

## 2018-02-13 NOTE — NURSING NOTE
Patient Information     Patient Name MRN Sex Dafne Wright [id number] Female 1980      Nursing Note by Erin Ignacio at 2018  4:00 PM     Author:  Erin Ignacio Service:  (none) Author Type:  (none)     Filed:  2018  4:17 PM Encounter Date:  2018 Status:  Signed     :  Erin Ignacio            Patient presents for routine OB care currently at 26w4d. Patient was offered the breastfeeding booklet, Operational Delivery consent for review. 2 Babystep coupons given.     Catie Ignacio LPN............. 2018 4:17 PM

## 2018-02-13 NOTE — PROGRESS NOTES
Patient Information     Patient Name MRN Sex Dafne Wright [id number] Female 1980      Progress Notes by Tu Mejia MD at 2018  4:00 PM     Author:  Tu Mejia MD Service:  (none) Author Type:  Physician     Filed:  2018  4:51 PM Encounter Date:  2018 Status:  Signed     :  Tu Mejia MD (Physician)            [facility]    Problem List:  Estimated Date of Delivery: 18  Hx of LTCS for fetal intolerance of labor  Twin gestation, (di/di)  Clinically significant anxiety  Repeat  37-38 weeks.    OB History                    Para  Term     AB  Living     2   1  1       1     SAB   TAB  Ectopic  Multiple   Live Births                 1                        # Outcome Date  GA  Lbr Frank/2nd  Weight Sex  Delivery Anes PTL Lv   2 Current                1 Term 16  40w0d    3.51 kg (7 lb 11.8 oz) M   SPINAL  DANNY      Complications: Fetal Intolerance                                    Immunization History     Administered  Date(s) Administered     AMB Influenza, IIV4 PF (=>6 mos Flulaval;=>3 yrs Fluzone Fluarix)(Flu Clinic Only) 2016     Influenza, IIV4 2015, 2017     Tdap 2016       O Rh Positive  28 week labs:2018  Tdap 2018  Flu:done  GBS:*    S:  Active babies, US today, read pending. No new concerns.    O: /68 (Cuff Site: Right Arm, Position: Sitting, Cuff Size: Adult Large)  Pulse 88  Wt 77.6 kg (171 lb)  LMP 2017  BMI 28.46 kg/m2  See flowsheet    US today shows normal fluid, normal growth, normal heart rate x 2.    A/P:  Dafne Altman is a 37 y.o.  at 26w4d by early US, here for return OB visit  Monthly growth US.  Anxiety and impression is better/stable.    RTC in two weeks.  Discussed signs of CAROLINA  GCT today.  Plan for repeat  at 37-38 weeks.  Around 18    Tu Mejia MD FACOG  4:49 PM 2018

## 2018-02-13 NOTE — TELEPHONE ENCOUNTER
"Patient Information     Patient Name MRN Dafne Bates [id number] Female 1980      Telephone Encounter by Stefania Burgos RN at 2018  1:30 PM     Author:  Stefania Burgos RN Service:  (none) Author Type:  NURS- Registered Nurse     Filed:  2018  1:51 PM Encounter Date:  2018 Status:  Signed     :  Stefania Burgos RN (NURS- Registered Nurse)            Patient calls today with concerns about stomach flu during pregnancy. Patient reports vomiting about 5 times in the past 24 hours and has had 1 bout of diarrhea this morning. Her son had similar symptoms recently. She has been able to keep down water today and kept her breakfast down. She is starting to feel better today.     She was most concerned about how this would affect her pregnancy. She was advised that a short bout of stomach illness should not have any negative effect on her babies since her body will nourish the babies, even if she is not eating much. She will start eating bland foods as tolerated and continue drinking water.    She will continue to monitor at home and will call back if symptoms persist or worsen.    Stefania Burgos RN ....................  2018   1:51 PM      Reason for Disposition    MILD or MODERATE vomiting (e.g., 1 - 5 times / day) (all triage questions negative)    Answer Assessment - Initial Assessment Questions  1. VOMITING SEVERITY: \"How many times have you vomited in the past 24 hours?\"      - MILD:  1 - 2 times/day     - MODERATE: 3 - 5 times/day, decreased oral intake without significant weight loss or symptoms of dehydration     - SEVERE: 6 or more times/day, vomits everything or nearly everything, with significant weight loss, symptoms of dehydration       About 5 times since yesterday  2. ONSET: \"When did the vomiting begin?\"       yesterday  3. FLUIDS: \"What fluids or food have you vomited up today?\" \"Have you been able to keep any fluids down?\"      Had breakfast, " "does not think she vomited that up (did not see it), able to keep down water today  4. ABDOMINAL PAIN: \"Are your having any abdominal pain?\" If yes : \"How bad is it and what does it feel like?\" (e.g., crampy, dull, intermittent, constant)       denies  5. DIARRHEA: \"Is there any diarrhea?\" If so, ask: \"How many times today?\"       1 episode this morning  6. CONTACTS: \"Is there anyone else in the family with the same symptoms?\"       Son had it  7. CAUSE: \"What do you think is causing your vomiting?\"      Viral stomach bug  8. HYDRATION STATUS: \"Any signs of dehydration?\" (e.g., dry mouth [not only dry lips], too weak to stand) \"When did you last urinate?\"      denies  9. OTHER SYMPTOMS: \"Do you have any other symptoms?\" (e.g., fever, headache, vertigo, vomiting blood or coffee grounds)      no  10. PREGNANCY: \"Is there any chance you are pregnant?\" \"When was your last menstrual period?\"        Yes 25w4d    Protocols used: ADULT VOMITING-A-AH            "

## 2018-02-14 ENCOUNTER — TELEPHONE (OUTPATIENT)
Dept: OBGYN | Facility: OTHER | Age: 38
End: 2018-02-14

## 2018-02-14 NOTE — TELEPHONE ENCOUNTER
Patient calling for results of 1 hour glucose testing. See result note for further documentation.  Iliana Melgar RN............. 2/14/2018 11:17 AM

## 2018-02-23 ENCOUNTER — DOCUMENTATION ONLY (OUTPATIENT)
Dept: FAMILY MEDICINE | Facility: OTHER | Age: 38
End: 2018-02-23

## 2018-02-28 ENCOUNTER — HOSPITAL ENCOUNTER (OUTPATIENT)
Dept: ULTRASOUND IMAGING | Facility: OTHER | Age: 38
Discharge: HOME | End: 2018-02-28
Attending: OBSTETRICS & GYNECOLOGY | Admitting: OBSTETRICS & GYNECOLOGY
Payer: COMMERCIAL

## 2018-02-28 DIAGNOSIS — Z34.92 NORMAL PREGNANCY, SECOND TRIMESTER: ICD-10-CM

## 2018-02-28 PROCEDURE — 76816 OB US FOLLOW-UP PER FETUS: CPT

## 2018-02-28 NOTE — PROGRESS NOTES
At the time of this ultrasound, the patient declined transvaginal scanning. She did not understand why this would be, and would like to talk this over with her provider. She wasn't mentally prepared for that exam. She noted her frustration with scheduling, she thought she had a doctor appointment with this exam, and there is nothing scheduled. Two weeks ago, she handed the yellow slip to the  and trusted that she would be set up with everything she needed. We changed the order to twin EFW being it has been over 4 weeks and the patient was here for that assumption. Do took over this exam being it was not resolved for 35 minutes. She was very pleasant, just frustrated being that Dr appointments feel so unorganized.

## 2018-03-06 ENCOUNTER — PRENATAL OFFICE VISIT (OUTPATIENT)
Dept: OBGYN | Facility: OTHER | Age: 38
End: 2018-03-06
Attending: OBSTETRICS & GYNECOLOGY
Payer: COMMERCIAL

## 2018-03-06 VITALS
DIASTOLIC BLOOD PRESSURE: 72 MMHG | BODY MASS INDEX: 29.45 KG/M2 | HEART RATE: 76 BPM | WEIGHT: 177 LBS | SYSTOLIC BLOOD PRESSURE: 112 MMHG

## 2018-03-06 DIAGNOSIS — O30.049 DICHORIONIC DIAMNIOTIC TWIN PREGNANCY, ANTEPARTUM: Primary | ICD-10-CM

## 2018-03-06 PROCEDURE — 99207 ZZC OB VISIT-NO CHARGE - GICH ONLY: CPT | Performed by: OBSTETRICS & GYNECOLOGY

## 2018-03-06 ASSESSMENT — PAIN SCALES - GENERAL: PAINLEVEL: NO PAIN (0)

## 2018-03-06 NOTE — NURSING NOTE
"Date of Surgery: 18  Type of Surgery: repeat  and tubal sterilization  Surgeon: Dr. Tu Mejia MD, FACOG    Patient's consents were signed and appropriate appointments were scheduled by the Unit [facility] . Patient was given surgical folder which includes pre-operative bathing instructions related to the two packets of Hibiclens surgical prep provided. Surgical forms were faxed to [phone], x1601 and x1801, copies made and kept for informative purposes, and originals were delivered to Day-surgery. Patient denies questions at this time.        STOP BANG    Fever/Chills or other infectious symptoms in past month? no  >10 pound weight loss in the past 2 months? no  Health Care Directive on file? no  History of blood transfusions? no  Td up to date? yes  History of VRE/MRSA? no      Obstructive Sleep Apnea screening    Preoperative Evaluation: Obstructive Sleep Apnea screening    S: Snore -  Do you snore loudly? (louder than talking or loud enough to be heard through closed doors)NO  T: Tired - Do you often feel tired, fatigued, or sleepy during the daytime?NO  O: Observed - Has anyone ever observed you stop breathing during your sleep?NO  P: Pressure - Do you have or are you being treated for high blood pressure?NO  B: BMI - BMI greater than 35kg/m2?NO  A: Age - Age over 50 years old?NO  N: Neck - Neck circumference greater than 40 cm?NO  G: Gender - Gender: Male?NO    Total number of \"YES\" responses:  0    Scoring: Low risk of JULIETTE 0-2  At Risk of JULIETTE: >3 High Risk of JULIETTE: 5-8      Total yes answers in JULIETTE section:    Low risk 0-2  At risk 3-4  High risk 5-8    Stefania Burgos............. 3/6/2018 3:38 PM     "

## 2018-03-06 NOTE — MR AVS SNAPSHOT
After Visit Summary   3/6/2018    Dafne Altman    MRN: [id number]           Patient Information     Date Of Birth          1980        Visit Information        Provider Department      3/6/2018 3:15 PM Tu Mejia MD [facility]         Follow-ups after your visit        Your next 10 appointments already scheduled     Mar 23, 2018  3:30 PM CDT   ESTABLISHED PRENATAL with Tu Mejia MD   [facility] ([facility])    [address]  [place] [address]   [phone]            Mar 30, 2018  2:45 PM CDT   ESTABLISHED PRENATAL with Tu Mejia MD   [facility] ([facility])    [address]  [place] [address]   [phone]            Apr 06, 2018  3:00 PM CDT   ESTABLISHED PRENATAL with Tu Mejia MD   [facility] ([facility])    [address]  [place] [address]   [phone]            Apr 12, 2018  2:00 PM CDT   ESTABLISHED PRENATAL with Tu Mejia MD   [facility] ([facility])    [address]  [place] [address]   [phone]            Apr 30, 2018 12:45 PM CDT   SHORT with Tu Mejia MD   [facility] ([facility])    [address]  [place] [address]   [phone]            May 24, 2018 12:45 PM CDT   Post Partum with Tu Mejia MD   [facility] ([facility])    [address]  [place] [address]   [phone]              Who to contact     If you have questions or need follow up information about today's clinic visit or your schedule please contact [facility] directly at [phone].  Normal or non-critical lab and imaging results will be communicated to you by MyChart, letter or phone  "within 4 business days after the clinic has received the results. If you do not hear from us within 7 days, please contact the clinic through [employer] or phone. If you have a critical or abnormal lab result, we will notify you by phone as soon as possible.  Submit refill requests through [employer] or call your pharmacy and they will forward the refill request to us. Please allow 3 business days for your refill to be completed.          Additional Information About Your Visit        [employer] Information     [employer] lets you send messages to your doctor, view your test results, renew your prescriptions, schedule appointments and more. To sign up, go to [email or web address] . Click on \"Log in\" on the left side of the screen, which will take you to the Welcome page. Then click on \"Sign up Now\" on the right side of the page.     You will be asked to enter the access code listed below, as well as some personal information. Please follow the directions to create your username and password.     Your access code is: [email or web address]  Expires: 2018  7:57 AM     Your access code will  in 90 days. If you need help or a new code, please call your [place] clinic or [phone].        Care EveryWhere ID     This is your Care EveryWhere ID. This could be used by other organizations to access your [place] medical records  [id number]        Your Vitals Were     Pulse Last Period BMI (Body Mass Index)             76 2017 (LMP Unknown) 29.45 kg/m2          Blood Pressure from Last 3 Encounters:   18 112/72   18 120/70   18 128/68    Weight from Last 3 Encounters:   18 80.3 kg (177 lb)   18 79.7 kg (175 lb 9.6 oz)   18 77.6 kg (171 lb)              Today, you had the following     No orders found for display       Primary Care Provider Office Phone # Fax #    Milagro Robbins [phone] [address] [facility] [address]        Equal Access " to Services     LUIS FELIPE CLINE : Alida Do, sanket flanagan, qaarmen nguyễn. So [facility] [phone].    ATENCIÓN: Si habla shweta, tiene a alfonso disposición servicios gratuitos de asistencia lingüística. Llame al [phone].    We comply with applicable federal civil rights laws and [place] laws. We do not discriminate on the basis of race, color, national origin, age, disability, sex, sexual orientation, or gender identity.            Thank you!     Thank you for choosing [facility]  for your care. Our goal is always to provide you with excellent care. Hearing back from our patients is one way we can continue to improve our services. Please take a few minutes to complete the written survey that you may receive in the mail after your visit with us. Thank you!             Your Updated Medication List - Protect others around you: Learn how to safely use, store and throw away your medicines at www.disposemymeds.org.          This list is accurate as of 3/6/18  3:58 PM.  Always use your most recent med list.                   Brand Name Dispense Instructions for use Diagnosis    ALGAL-900  MG Caps   Generic drug:  Docosahexaenoic Acid      Take 1 capsule by mouth daily        aluminum chloride 20 % external solution    DRYSOL     Apply topically daily as needed        ferrous sulfate 325 (65 FE) MG Tbec EC tablet      Take 1 tablet by mouth daily        folic acid 1 MG tablet    FOLVITE     Take 1 tablet by mouth daily        prenatal multivitamin plus iron 27-0.8 MG Tabs per tablet      Take 1 tablet by mouth daily

## 2018-03-06 NOTE — PROGRESS NOTES
Patient Information     Patient Name MRN Sex Dafne Wright [id number] Female 1980        [facility]    Problem List:  Estimated Date of Delivery: 18  Hx of LTCS for fetal intolerance of labor  Twin gestation, (di/di)  Clinically significant anxiety  Repeat  37-38 weeks.    OB History    Para Term  AB Living   2 1 1   1   SAB TAB Ectopic Multiple Live Births       1      # Outcome Date GA Lbr Frank/2nd Weight Sex Delivery Anes PTL Lv   2 Current            1 Term 16 40w0d  3.51 kg (7 lb 11.8 oz) M  SPINAL  DANNY      Complications: Fetal Intolerance        Immunization History   Administered Date(s) Administered     AMB Influenza, IIV4 PF (=>6 mos Flulaval;=>3 yrs Fluzone Fluarix)(Flu Clinic Only) 2016     Influenza, IIV4 2015, 2017     Tdap 2016       O Rh Positive  28 week labs:2018  Tdap 2018  Flu:done  GBS:*    S:  Active babies, some lower extremity swelling bilaterally.    O: /72 (BP Location: Right arm, Patient Position: Sitting)  Pulse 76  Wt 80.3 kg (177 lb)  LMP 2017 (LMP Unknown)  BMI 29.45 kg/m2    See flowsheet    A/P:  Dafne Altman is a 37 y.o.  at 31w4d by early US, here for return OB visit  Monthly growth US.  Anxiety and impression is better/stable.    RTC weekly. Weekly NST's with next visit and beyond.  Discussed signs of CAROLINA  Plan for repeat  at 37-38 weeks.  18 (PCJ, peds)    Tu Mejia MD FACOG  3:28 PM 3/6/2018

## 2018-03-23 ENCOUNTER — PRENATAL OFFICE VISIT (OUTPATIENT)
Dept: OBGYN | Facility: OTHER | Age: 38
End: 2018-03-23
Attending: OBSTETRICS & GYNECOLOGY
Payer: COMMERCIAL

## 2018-03-23 ENCOUNTER — HOSPITAL ENCOUNTER (OUTPATIENT)
Dept: ULTRASOUND IMAGING | Facility: OTHER | Age: 38
Discharge: HOME | End: 2018-03-23
Attending: OBSTETRICS & GYNECOLOGY | Admitting: OBSTETRICS & GYNECOLOGY
Payer: COMMERCIAL

## 2018-03-23 VITALS
SYSTOLIC BLOOD PRESSURE: 106 MMHG | DIASTOLIC BLOOD PRESSURE: 70 MMHG | HEART RATE: 68 BPM | WEIGHT: 179.5 LBS | BODY MASS INDEX: 29.87 KG/M2

## 2018-03-23 DIAGNOSIS — O09.529 HIGH-RISK PREGNANCY, MULTIGRAVIDA OF ADVANCED MATERNAL AGE, ANTEPARTUM: Primary | ICD-10-CM

## 2018-03-23 DIAGNOSIS — Z98.891 HISTORY OF CESAREAN SECTION: ICD-10-CM

## 2018-03-23 DIAGNOSIS — O30.049 DICHORIONIC DIAMNIOTIC TWIN PREGNANCY, ANTEPARTUM: ICD-10-CM

## 2018-03-23 DIAGNOSIS — O30.043 DICHORIONIC DIAMNIOTIC TWIN PREGNANCY IN THIRD TRIMESTER: ICD-10-CM

## 2018-03-23 PROCEDURE — 99207 ZZC OB VISIT-NO CHARGE - GICH ONLY: CPT | Performed by: OBSTETRICS & GYNECOLOGY

## 2018-03-23 PROCEDURE — 76816 OB US FOLLOW-UP PER FETUS: CPT

## 2018-03-23 RX ORDER — LANOLIN ALCOHOL/MO/W.PET/CERES
3 CREAM (GRAM) TOPICAL
COMMUNITY
Start: 2018-03-23 | End: 2018-05-24

## 2018-03-23 ASSESSMENT — PAIN SCALES - GENERAL: PAINLEVEL: NO PAIN (0)

## 2018-03-23 NOTE — MR AVS SNAPSHOT
After Visit Summary   3/23/2018    Dafne Altman    MRN: [id number]           Patient Information     Date Of Birth          1980        Visit Information        Provider Department      3/23/2018 3:30 PM Tu Mejia MD [facility]        Today's Diagnoses     High-risk pregnancy, multigravida of advanced maternal age, antepartum    -  1    History of  section        Dichorionic diamniotic twin pregnancy in third trimester           Follow-ups after your visit        Your next 10 appointments already scheduled     Mar 30, 2018 11:00 AM CDT   ESTABLISHED PRENATAL with Tu Mejia MD   [facility] ([facility])    [address]  [place] [address]   [phone]            2018  3:00 PM CDT   ESTABLISHED PRENATAL with Tu Mejia MD   [facility] ([facility])    [address]  [place] [address]   [phone]            2018  2:00 PM CDT   ESTABLISHED PRENATAL with Tu Mejia MD   [facility] ([facility])    [address]  [place] [address]   [phone]            2018 12:45 PM CDT   SHORT with Tu Mejia MD   [facility] ([facility])    [address]  [place] [address]   [phone]            May 24, 2018 12:45 PM CDT   Post Partum with Tu Mejia MD   [facility] ([facility])    [address]  [place] [address]   [phone]              Future tests that were ordered for you today     Open Future Orders        Priority Expected Expires Ordered    US OB Twins Follow Up Repeat Routine 3/26/2018 3/6/2019 3/6/2018            Who to contact     If you have questions or need follow up information about today's clinic visit or your schedule  please contact [facility] directly at [phone].  Normal or non-critical lab and imaging results will be communicated to you by MyChart, letter or phone within 4 business days after the clinic has received the results. If you do not hear from us within 7 days, please contact the clinic through [employer] or phone. If you have a critical or abnormal lab result, we will notify you by phone as soon as possible.  Submit refill requests through [employer] or call your pharmacy and they will forward the refill request to us. Please allow 3 business days for your refill to be completed.          Additional Information About Your Visit        [employer] Information     [employer] gives you secure access to your electronic health record. If you see a primary care provider, you can also send messages to your care team and make appointments. If you have questions, please call your primary care clinic.  If you do not have a primary care provider, please call [phone] and they will assist you.        Care EveryWhere ID     This is your Care EveryWhere ID. This could be used by other organizations to access your [place] medical records  [id number]        Your Vitals Were     Pulse Last Period BMI (Body Mass Index)             68 07/28/2017 (LMP Unknown) 29.87 kg/m2          Blood Pressure from Last 3 Encounters:   03/23/18 106/70   03/06/18 112/72   02/12/18 120/70    Weight from Last 3 Encounters:   03/23/18 81.4 kg (179 lb 8 oz)   03/06/18 80.3 kg (177 lb)   02/12/18 79.7 kg (175 lb 9.6 oz)              Today, you had the following     No orders found for display       Primary Care Provider Office Phone # Fax #    Milagro Robbins [phone] [address]  [facility] [address]        Equal Access to Services     LUIS FELIPE CLINE : Alida Do, sanket flanagan, armen pham. So [facility]  [phone].    ATENCIÓN: Si jimmie rock, tiene a alfonso disposición servicios gratuitos de asistencia lingüística. Alie lambert [phone].    We comply with applicable federal civil rights laws and [place] laws. We do not discriminate on the basis of race, color, national origin, age, disability, sex, sexual orientation, or gender identity.            Thank you!     Thank you for choosing [facility] AND [facility]\""  for your care. Our goal is always to provide you with excellent care. Hearing back from our patients is one way we can continue to improve our services. Please take a few minutes to complete the written survey that you may receive in the mail after your visit with us. Thank you!             Your Updated Medication List - Protect others around you: Learn how to safely use, store and throw away your medicines at www.disposemymeds.org.          This list is accurate as of 3/23/18  3:54 PM.  Always use your most recent med list.                   Brand Name Dispense Instructions for use Diagnosis    ALGAL-900  MG Caps   Generic drug:  Docosahexaenoic Acid      Take 1 capsule by mouth daily        aluminum chloride 20 % external solution    DRYSOL     Apply topically daily as needed        ferrous sulfate 325 (65 FE) MG Tbec EC tablet      Take 1 tablet by mouth daily        folic acid 1 MG tablet    FOLVITE     Take 1 tablet by mouth daily        melatonin 3 MG tablet      Take 1 tablet (3 mg) by mouth nightly as needed for sleep        prenatal multivitamin plus iron 27-0.8 MG Tabs per tablet      Take 1 tablet by mouth daily

## 2018-03-23 NOTE — PROGRESS NOTES
Progress Notes   Tu Mejia MD (Physician)     OB/Gyn      Patient Information     Patient Name MRN Sex Dafne Wright [id number] Female 1980         [facility]     Problem List:  Estimated Date of Delivery: 18  Hx of LTCS for fetal intolerance of labor  Twin gestation, (di/di)  Clinically significant anxiety  Repeat  37-38 weeks.                      OB History    Para Term  AB Living   2 1 1     1   SAB TAB Ectopic Multiple Live Births           1       # Outcome Date GA Lbr Frank/2nd Weight Sex Delivery Anes PTL Lv   2 Current                     1 Term 16 40w0d   3.51 kg (7 lb 11.8 oz) M  SPINAL   DANNY      Complications: Fetal Intolerance               Immunization History   Administered Date(s) Administered     AMB Influenza, IIV4 PF (=>6 mos Flulaval;=>3 yrs Fluzone Fluarix)(Flu Clinic Only) 2016     Influenza, IIV4 2015, 2017     Tdap 2016         O Rh Positive  28 week labs:2018  Tdap 2018  Flu:done  GBS:*     S:  Active babies, some lower extremity swelling bilaterally.     O:/70 (BP Location: Right arm, Patient Position: Sitting, Cuff Size: Adult Large)  Pulse 68  Wt 81.4 kg (179 lb 8 oz)  LMP 2017 (LMP Unknown)  BMI 29.87 kg/m2    Results for orders placed or performed during the hospital encounter of 18   US OB Twins Follow Up Repeat    Narrative    US OB TWINS FOLLOW UP REPEAT    HISTORY: Monthly growth ultrasound - AMA Twin gestation; Dichorionic  diamniotic twin pregnancy, antepartum .    COMPARISON: 2018, 18.    FINDINGS:    A viable dichorionic diamniotic pregnancy is redemonstrated. The  placenta is anterior, grade 1.    Using the same naming as the prior two ultrasounds, baby A is  considered the left gestation (opposite the sheet).    Baby A is in breech longitudinal left position. Fetal cardiac activity  is 155 bpm. The deepest pocket of amniotic fluid is 4.6  cm.  Measurements of fetal growth include a biparietal diameter of 32 weeks  0 days, head circumference 34 weeks 0 days, abdominal circumference 31  weeks 5 days and femoral length 32 weeks 0 days. The estimated fetal  weight is 1885, at the 30rd percentile. The HC/AC ratio is 1.10.    Baby B is in cephalic longitudinal right position. Fetal cardiac  activity is 130 bpm. The deepest pocket of amniotic fluid is 6.4 cm.  Measurements of fetal growth including a biparietal diameter of 31  weeks 4 days, a head circumference of 32 weeks 3 days, abdominal  circumference of 33 weeks 4 days and femoral length of 31 weeks 0 day.  The estimated fetal weight is 1978g, at the 37rd percentile. HC/AC  ratio ratio is 1.00.      Impression    IMPRESSION:     Viable dichorionic diamniotic pregnancy demonstrating appropriate  growth.      BILL LORENZANA MD       See flowsheet     A/P:  Dafne Altman is a 37 y.o.  at 34w0d by early US, here for return OB visit  Monthly growth US. 30th centiles-37th centiles-see above report.  Anxiety and impression is better/stable.     RTC weekly. Weekly NST's with next visit and beyond.  Discussed signs of CAROLINA  Plan for repeat  at 37-38 weeks, tubal ligation.  18 (PCJ, peds)

## 2018-03-23 NOTE — NURSING NOTE
Patient presents today for a prenatal visit. She is currently 34w0d.  Latoya Iyer LPN  3/23/2018  3:04 PM

## 2018-03-30 ENCOUNTER — PRENATAL OFFICE VISIT (OUTPATIENT)
Dept: OBGYN | Facility: OTHER | Age: 38
End: 2018-03-30
Attending: OBSTETRICS & GYNECOLOGY
Payer: COMMERCIAL

## 2018-03-30 VITALS
BODY MASS INDEX: 30.02 KG/M2 | WEIGHT: 180.4 LBS | DIASTOLIC BLOOD PRESSURE: 72 MMHG | SYSTOLIC BLOOD PRESSURE: 122 MMHG | HEART RATE: 66 BPM

## 2018-03-30 DIAGNOSIS — O30.043 DICHORIONIC DIAMNIOTIC TWIN PREGNANCY IN THIRD TRIMESTER: Primary | ICD-10-CM

## 2018-03-30 PROCEDURE — 99207 ZZC OB VISIT-NO CHARGE - GICH ONLY: CPT | Performed by: OBSTETRICS & GYNECOLOGY

## 2018-03-30 PROCEDURE — 87081 CULTURE SCREEN ONLY: CPT | Performed by: OBSTETRICS & GYNECOLOGY

## 2018-03-30 NOTE — MR AVS SNAPSHOT
After Visit Summary   3/30/2018    Dafne Altman    MRN: [id number]           Patient Information     Date Of Birth          1980        Visit Information        Provider Department      3/30/2018 11:00 AM Tu Mejia MD [facility]        Today's Diagnoses     Dichorionic diamniotic twin pregnancy in third trimester    -  1       Follow-ups after your visit        Your next 10 appointments already scheduled     Apr 06, 2018  3:00 PM CDT   ESTABLISHED PRENATAL with Tu [facility]jia [facility]   [facility]    [address]  [place] [address]   [phone]            Apr 12, 2018  2:00 PM CDT   ESTABLISHED PRENATAL with Tu Mejia MD   [facility]    [address]  [place] [address]   [phone]            Apr 16, 2018   Procedure with Tu Mejia MD   [facility]    [address]  [place] [address]   [phone]            Apr 30, 2018 12:45 PM CDT   SHORT with [facility]jia MD   [facility]    [address]  [place] [address]   [phone]            May 24, 2018 12:45 PM CDT   Post Partum with Tu Mejia MD   [facility]    [address]  [place] [address]   [phone]              Who to contact     If you have questions or need follow up information about today's clinic visit or your schedule please contact [facility] directly at [phone].  Normal or non-critical lab and imaging results will be communicated to you by MyChart, letter or phone within 4 business days after the clinic has received the results. If you do not hear from us within 7 days, please contact the clinic through MentorDOTMet  or phone. If you have a critical or abnormal lab result, we will notify you by phone as soon as possible.  Submit refill requests through Iceotope or call your pharmacy and they will forward the refill request to us. Please allow 3 business days for your refill to be completed.          Additional Information About Your Visit        MicroEnsurehart Information     Iceotope gives you secure access to your electronic health record. If you see a primary care provider, you can also send messages to your care team and make appointments. If you have questions, please call your primary care clinic.  If you do not have a primary care provider, please call [phone] and they will assist you.        Care EveryWhere ID     This is your Care EveryWhere ID. This could be used by other organizations to access your [place] medical records  [id number]        Your Vitals Were     Pulse Last Period BMI (Body Mass Index)             66 07/28/2017 (LMP Unknown) 30.02 kg/m2          Blood Pressure from Last 3 Encounters:   03/30/18 122/72   03/23/18 106/70   03/06/18 112/72    Weight from Last 3 Encounters:   03/30/18 81.8 kg (180 lb 6.4 oz)   03/23/18 81.4 kg (179 lb 8 oz)   03/06/18 80.3 kg (177 lb)              We Performed the Following     Beta strep group B r/o culture        Primary Care Provider Office Phone # Fax #    Milagro MATHIS Juan Robbins [phone]       [address] [place] [place] [address]        Equal Access to Services     [facility] : Hadii aad ku hadasho Sogerardoali, waaxda luqadaha, qaybta kaalmada diana, armen muniz . So [facility] [phone].    ATENCIÓN: Si lianla shweta, tiene a alfonso disposición servicios gratuitos de asistencia lingüística. Llame al [phone].    We comply with applicable federal civil rights laws and [place] laws. We do not discriminate on the basis of race, color, national origin, age, disability, sex, sexual orientation, or gender  identity.            Thank you!     Thank you for choosing [facility] AND hospitals  for your care. Our goal is always to provide you with excellent care. Hearing back from our patients is one way we can continue to improve our services. Please take a few minutes to complete the written survey that you may receive in the mail after your visit with us. Thank you!             Your Updated Medication List - Protect others around you: Learn how to safely use, store and throw away your medicines at www.disposemymeds.org.          This list is accurate as of 3/30/18  4:37 PM.  Always use your most recent med list.                   Brand Name Dispense Instructions for use Diagnosis    ALGAL-900  MG Caps   Generic drug:  Docosahexaenoic Acid      Take 1 capsule by mouth daily        aluminum chloride 20 % external solution    DRYSOL     Apply topically daily as needed        ferrous sulfate 325 (65 FE) MG Tbec EC tablet      Take 1 tablet by mouth daily        folic acid 1 MG tablet    FOLVITE     Take 1 tablet by mouth daily        melatonin 3 MG tablet      Take 1 tablet (3 mg) by mouth nightly as needed for sleep        prenatal multivitamin plus iron 27-0.8 MG Tabs per tablet      Take 1 tablet by mouth daily

## 2018-03-30 NOTE — PROGRESS NOTES
Progress Notes   Tu Mejia MD (Physician)     OB/Gyn      Patient Information     Patient Name MRN Sex      Dafne Altman [id number] Female 1980         [facility]     Problem List:  Estimated Date of Delivery: 18  Hx of LTCS for fetal intolerance of labor  Twin gestation, (di/di)  Clinically significant anxiety  Repeat  37-38 weeks.                      OB History    Para Term  AB Living   2 1 1     1   SAB TAB Ectopic Multiple Live Births           1       # Outcome Date GA Lbr Frank/2nd Weight Sex Delivery Anes PTL Lv   2 Current                     1 Term 16 40w0d   3.51 kg (7 lb 11.8 oz) M  SPINAL   DANNY      Complications: Fetal Intolerance               Immunization History   Administered Date(s) Administered     AMB Influenza, IIV4 PF (=>6 mos Flulaval;=>3 yrs Fluzone Fluarix)(Flu Clinic Only) 2016     Influenza, IIV4 2015, 2017     Tdap 2016         O Rh Positive  28 week labs:2018  Tdap 2018  Flu:done  GBS:*     S:  Active babies, some pelvic pressure.     O:/72 (BP Location: Right arm, Patient Position: Chair, Cuff Size: Adult Regular)  Pulse 66  Wt 81.8 kg (180 lb 6.4 oz)  LMP 2017 (LMP Unknown)  BMI 30.02 kg/m2    Results for orders placed or performed in visit on 18   Beta strep group B r/o culture   Result Value Ref Range    Specimen Description Vaginal Rectal     Culture Micro PENDING    NST cat 1 for both Twins.  Normal AFV for both babies.    See flowsheet     A/P:  Dafne Altman is a 37 y.o.  at 35w0d by early US, here for return OB visit  Monthly growth US. 30th centiles-37th centiles-see above report.  Anxiety and impression is better/stable.     RTC weekly. Weekly NST's with next visit and beyond.  Discussed signs of CAROLINA  Plan for repeat  at 37-38 weeks, tubal ligation.  18 (PCJ, peds)

## 2018-04-01 LAB
BACTERIA SPEC CULT: NORMAL
SPECIMEN SOURCE: NORMAL

## 2018-04-06 ENCOUNTER — PRENATAL OFFICE VISIT (OUTPATIENT)
Dept: OBGYN | Facility: OTHER | Age: 38
End: 2018-04-06
Attending: OBSTETRICS & GYNECOLOGY
Payer: COMMERCIAL

## 2018-04-06 VITALS
DIASTOLIC BLOOD PRESSURE: 72 MMHG | HEART RATE: 68 BPM | WEIGHT: 182.1 LBS | BODY MASS INDEX: 30.3 KG/M2 | SYSTOLIC BLOOD PRESSURE: 112 MMHG

## 2018-04-06 DIAGNOSIS — O30.043 DICHORIONIC DIAMNIOTIC TWIN PREGNANCY IN THIRD TRIMESTER: ICD-10-CM

## 2018-04-06 DIAGNOSIS — J01.00 ACUTE NON-RECURRENT MAXILLARY SINUSITIS: Primary | ICD-10-CM

## 2018-04-06 DIAGNOSIS — Z98.891 HISTORY OF C-SECTION: ICD-10-CM

## 2018-04-06 PROCEDURE — 59025 FETAL NON-STRESS TEST: CPT | Performed by: OBSTETRICS & GYNECOLOGY

## 2018-04-06 PROCEDURE — 99207 ZZC OB VISIT-NO CHARGE - GICH ONLY: CPT | Performed by: OBSTETRICS & GYNECOLOGY

## 2018-04-06 RX ORDER — AZITHROMYCIN 250 MG/1
TABLET, FILM COATED ORAL
Qty: 6 TABLET | Refills: 0 | Status: ON HOLD | OUTPATIENT
Start: 2018-04-06 | End: 2018-04-17

## 2018-04-06 NOTE — MR AVS SNAPSHOT
After Visit Summary   2018    Dafne Altman    MRN: [id number]           Patient Information     Date Of Birth          1980        Visit Information        Provider Department      2018 3:00 PM Tu Mejia MD [facility]        Today's Diagnoses     Acute non-recurrent maxillary sinusitis    -  1    Dichorionic diamniotic twin pregnancy in third trimester        History of            Follow-ups after your visit        Your next 10 appointments already scheduled     2018  2:00 PM CDT   ESTABLISHED PRENATAL with Tu Mejia MD   [facility] ([facility])    [address]  [place] [address]   [phone]            2018   Procedure with Tu Mejia MD   [facility] ([facility])    [address]  [place] [address]   [phone]            2018 12:45 PM CDT   SHORT with Tu Mejia MD   [facility] ([facility])    [address]  [place] [address]   [phone]            May 24, 2018 12:45 PM CDT   Post Partum with Tu Mejia MD   [facility] ([facility])    [address]  [place] [address]   [phone]              Who to contact     If you have questions or need follow up information about today's clinic visit or your schedule please contact [facility] directly at [phone].  Normal or non-critical lab and imaging results will be communicated to you by Docstochart, letter or phone within 4 business days after the clinic has received the results. If you do not hear from us within 7 days, please contact the clinic through Docstochart or phone. If you have a critical or abnormal lab result, we will notify you by phone as soon as possible.  Submit refill requests through XATA  or call your pharmacy and they will forward the refill request to us. Please allow 3 business days for your refill to be completed.          Additional Information About Your Visit        MTM Technologieshart Information     Housekeep gives you secure access to your electronic health record. If you see a primary care provider, you can also send messages to your care team and make appointments. If you have questions, please call your primary care clinic.  If you do not have a primary care provider, please call [phone] and they will assist you.        Care EveryWhere ID     This is your Care EveryWhere ID. This could be used by other organizations to access your [place] medical records  [id number]        Your Vitals Were     Pulse Last Period BMI (Body Mass Index)             68 07/28/2017 (LMP Unknown) 30.3 kg/m2          Blood Pressure from Last 3 Encounters:   04/06/18 112/72   03/30/18 122/72   03/23/18 106/70    Weight from Last 3 Encounters:   04/06/18 82.6 kg (182 lb 1.6 oz)   03/30/18 81.8 kg (180 lb 6.4 oz)   03/23/18 81.4 kg (179 lb 8 oz)              We Performed the Following     FETAL NON-STRESS TEST          Today's Medication Changes          These changes are accurate as of 4/6/18  3:59 PM.  If you have any questions, ask your nurse or doctor.               Start taking these medicines.        Dose/Directions    azithromycin 250 MG tablet   Commonly known as:  ZITHROMAX   Used for:  Acute non-recurrent maxillary sinusitis   Started by:  Tu Mejia MD        Two tablets first day, then one tablet daily for four days.   Quantity:  6 tablet   Refills:  0            Where to get your medicines      These medications were sent to [facility] [address] [place] [address]     Phone:  [phone]     azithromycin 250 MG tablet                Primary Care Provider Office Phone # Fax #    Milagro DEL Robbins [phone]  [phone]       [address]  [place] [address]        Equal Access to Services     TUTUJAVI SON : Hadii aad ku hadroopa Stewartali, ankushda gardenialoriha, ted kacristianda nadyadavidgeoffrey, waxay idiin haysoniaelena castellanosimelda harinigabriellemervat jorge. So [facility] [phone].    ATENCIÓN: Si habla español, tiene a alfonso disposición servicios gratuitos de asistencia lingüística. Llame al [phone].    We comply with applicable federal civil rights laws and [place] laws. We do not discriminate on the basis of race, color, national origin, age, disability, sex, sexual orientation, or gender identity.            Thank you!     Thank you for choosing [facility] AND [facility]  for your care. Our goal is always to provide you with excellent care. Hearing back from our patients is one way we can continue to improve our services. Please take a few minutes to complete the written survey that you may receive in the mail after your visit with us. Thank you!             Your Updated Medication List - Protect others around you: Learn how to safely use, store and throw away your medicines at www.disposemymeds.org.          This list is accurate as of 4/6/18  3:59 PM.  Always use your most recent med list.                   Brand Name Dispense Instructions for use Diagnosis    ALGAL-900  MG Caps   Generic drug:  Docosahexaenoic Acid      Take 1 capsule by mouth daily        aluminum chloride 20 % external solution    DRYSOL     Apply topically daily as needed        azithromycin 250 MG tablet    ZITHROMAX    6 tablet    Two tablets first day, then one tablet daily for four days.    Acute non-recurrent maxillary sinusitis       ferrous sulfate 325 (65 FE) MG Tbec EC tablet      Take 1 tablet by mouth daily        folic acid 1 MG tablet    FOLVITE     Take 1 tablet by mouth daily        melatonin 3 MG tablet      Take 1 tablet (3 mg) by mouth nightly as needed for sleep        prenatal multivitamin plus iron 27-0.8 MG Tabs per tablet       Take 1 tablet by mouth daily

## 2018-04-06 NOTE — PROGRESS NOTES
Progress Notes   Tu Mejia MD (Physician)     OB/Gyn      Patient Information     Patient Name MRN Sex      Dafne Altman [id number] Female 1980         [facility]     Problem List:  Estimated Date of Delivery: 18  Hx of LTCS for fetal intolerance of labor  Twin gestation, (di/di)  Clinically significant anxiety  Repeat  37-38 weeks. (18 with Dr. Rapp and Dr. Sibley)                      OB History    Para Term  AB Living   2 1 1     1   SAB TAB Ectopic Multiple Live Births           1       # Outcome Date GA Lbr Frank/2nd Weight Sex Delivery Anes PTL Lv   2 Current                     1 Term 16 40w0d   3.51 kg (7 lb 11.8 oz) M  SPINAL   DANNY      Complications: Fetal Intolerance               Immunization History   Administered Date(s) Administered     AMB Influenza, IIV4 PF (=>6 mos Flulaval;=>3 yrs Fluzone Fluarix)(Flu Clinic Only) 2016     Influenza, IIV4 2015, 2017     Tdap 2016         O Rh Positive  28 week labs:2018  Tdap 2018  Flu:done  GBS:*     S:  Active babies, some pelvic pressure. She has significant pain and nasal drainage in her left maxillary sinus to paranasal sinuses on her left. No fever or cough today.   O:/72 (BP Location: Right arm, Patient Position: Chair, Cuff Size: Adult Regular)  Pulse 68  Wt 82.6 kg (182 lb 1.6 oz)  LMP 2017 (LMP Unknown)  BMI 30.3 kg/m2    Results for orders placed or performed in visit on 18   Beta strep group B r/o culture   Result Value Ref Range    Specimen Description Vaginal Rectal     Culture Micro No Group B Streptococcus isolated    NST cat 1 for both Twins.      See flowsheet     A/P:  Dafne Altman is a 37 y.o.  at 36w0d  by early US, here for return OB visit    RTC weekly. Weekly NST's   Discussed signs of CAROLINA  Plan for repeat  at 37-38 weeks, tubal ligation.

## 2018-04-12 ENCOUNTER — PRENATAL OFFICE VISIT (OUTPATIENT)
Dept: OBGYN | Facility: OTHER | Age: 38
End: 2018-04-12
Attending: OBSTETRICS & GYNECOLOGY
Payer: COMMERCIAL

## 2018-04-12 VITALS
DIASTOLIC BLOOD PRESSURE: 82 MMHG | BODY MASS INDEX: 30.34 KG/M2 | WEIGHT: 182.3 LBS | HEART RATE: 78 BPM | SYSTOLIC BLOOD PRESSURE: 142 MMHG

## 2018-04-12 DIAGNOSIS — O13.3 PREGNANCY-INDUCED HYPERTENSION IN THIRD TRIMESTER: Primary | ICD-10-CM

## 2018-04-12 LAB
ALBUMIN MFR UR ELPH: 33 MG/DL (ref 1–14)
CREAT UR-MCNC: 83 MG/DL
PROT/CREAT 24H UR: 0.4 MG/G{CREAT}

## 2018-04-12 PROCEDURE — 99207 ZZC OB VISIT-NO CHARGE - GICH ONLY: CPT | Performed by: OBSTETRICS & GYNECOLOGY

## 2018-04-12 PROCEDURE — 84156 ASSAY OF PROTEIN URINE: CPT | Performed by: OBSTETRICS & GYNECOLOGY

## 2018-04-12 ASSESSMENT — PAIN SCALES - GENERAL: PAINLEVEL: NO PAIN (0)

## 2018-04-12 NOTE — PROGRESS NOTES
Progress Notes   Tu Mejia MD (Physician)     OB/Gyn      Patient Information     Patient Name MRN Sex      Dafne Altman [id number] Female 1980         [facility]     Problem List:  Estimated Date of Delivery: 18  Hx of LTCS for fetal intolerance of labor  Twin gestation, (di/di)  Clinically significant anxiety  Repeat  37-38 weeks. (18 with Dr. Rapp and Dr. Sibley)                      OB History    Para Term  AB Living   2 1 1     1   SAB TAB Ectopic Multiple Live Births           1       # Outcome Date GA Lbr Frank/2nd Weight Sex Delivery Anes PTL Lv   2 Current                     1 Term 16 40w0d   3.51 kg (7 lb 11.8 oz) M  SPINAL   DANNY      Complications: Fetal Intolerance               Immunization History   Administered Date(s) Administered     AMB Influenza, IIV4 PF (=>6 mos Flulaval;=>3 yrs Fluzone Fluarix)(Flu Clinic Only) 2016     Influenza, IIV4 2015, 2017     Tdap 2016         O Rh Positive  28 week labs:2018  Tdap 2018  Flu:done  GBS:done     S:  Active babies, some pelvic pressure. She has significant pain and nasal drainage in her left maxillary sinus to paranasal sinuses on her left. No fever or cough today.   O:/82  Pulse 78  Wt 82.7 kg (182 lb 4.8 oz)  LMP 2017 (LMP Unknown)  BMI 30.34 kg/m2    Results for orders placed or performed in visit on 18   Beta strep group B r/o culture   Result Value Ref Range    Specimen Description Vaginal Rectal     Culture Micro No Group B Streptococcus isolated    NST cat 1 for both Twins.      See flowsheet     A/P:  Dafne Altman is a 37 y.o.  at 36w6d  by early US, here for return OB visit    Plan for repeat  at 18 with tubal ligation. Will assess for proteinuria, consider earlier delivery if she develops preeclampsia.

## 2018-04-12 NOTE — MR AVS SNAPSHOT
After Visit Summary   4/12/2018    Dafne Altman    MRN: [id number]           Patient Information     Date Of Birth          1980        Visit Information        Provider Department      4/12/2018 2:00 PM Tu Mejia MD [facility]        Today's Diagnoses     Pregnancy-induced hypertension in third trimester    -  1       Follow-ups after your visit        Your next 10 appointments already scheduled     Apr 16, 2018   Procedure with Tu Mejia MD   [facility]    [address]  [place] [address]   [phone]            Apr 30, 2018 12:45 PM CDT   SHORT with Tu Mejia MD   [facility]    [address]  [place] [address]   [phone]            May 24, 2018 12:45 PM CDT   Post Partum with Tu Mejia MD   [facility] ([facility]    [address]  [place] [address]   [phone]              Who to contact     If you have questions or need follow up information about today's clinic visit or your schedule please contact [facility] directly at [phone].  Normal or non-critical lab and imaging results will be communicated to you by Powderhookhart, letter or phone within 4 business days after the clinic has received the results. If you do not hear from us within 7 days, please contact the clinic through Powderhookhart or phone. If you have a critical or abnormal lab result, we will notify you by phone as soon as possible.  Submit refill requests through Audiosocket or call your pharmacy and they will forward the refill request to us. Please allow 3 business days for your refill to be completed.          Additional Information About Your Visit        PowderhookharLivrada Information     Audiosocket gives you secure access to your electronic health record. If you see a primary  care provider, you can also send messages to your care team and make appointments. If you have questions, please call your primary care clinic.  If you do not have a primary care provider, please call [phone] and they will assist you.        Care EveryWhere ID     This is your Care EveryWhere ID. This could be used by other organizations to access your [place] medical records  [id number]        Your Vitals Were     Pulse Last Period BMI (Body Mass Index)             78 07/28/2017 (LMP Unknown) 30.34 kg/m2          Blood Pressure from Last 3 Encounters:   04/12/18 142/82   04/06/18 112/72   03/30/18 122/72    Weight from Last 3 Encounters:   04/12/18 82.7 kg (182 lb 4.8 oz)   04/06/18 82.6 kg (182 lb 1.6 oz)   03/30/18 81.8 kg (180 lb 6.4 oz)              We Performed the Following     Protein Random Urine with Creat Ratio         Primary Care Provider Office Phone # Fax #    Milagro MATHIS Juan Robbins [phone]       [address] [facility] [address]        Equal Access to Services     [facility]: Hadii aad ku hadasho Sogerardoali, waaxda luqadaha, qaybta kaalmada adeimeldayada, armen muniz . So [facility] [phone].    ATENCIÓN: Si habla español, tiene a alfonso disposición servicios gratuitos de asistencia lingüística. Llame al [phone].    We comply with applicable federal civil rights laws and [place] laws. We do not discriminate on the basis of race, color, national origin, age, disability, sex, sexual orientation, or gender identity.            Thank you!     Thank you for choosing [facility] AND [facility]  for your care. Our goal is always to provide you with excellent care. Hearing back from our patients is one way we can continue to improve our services. Please take a few minutes to complete the written survey that you may receive in the mail after your visit with us. Thank you!             Your Updated Medication List - Protect others  around you: Learn how to safely use, store and throw away your medicines at www.disposemymeds.org.          This list is accurate as of 4/12/18  2:58 PM.  Always use your most recent med list.                   Brand Name Dispense Instructions for use Diagnosis    ALGAL-900  MG Caps   Generic drug:  Docosahexaenoic Acid      Take 1 capsule by mouth daily        aluminum chloride 20 % external solution    DRYSOL     Apply topically daily as needed        azithromycin 250 MG tablet    ZITHROMAX    6 tablet    Two tablets first day, then one tablet daily for four days.    Acute non-recurrent maxillary sinusitis       BENADRYL PO      Take by mouth nightly as needed        ferrous sulfate 325 (65 Fe) MG Tbec EC tablet      Take 1 tablet by mouth daily        folic acid 1 MG tablet    FOLVITE     Take 1 tablet by mouth daily        melatonin 3 MG tablet      Take 1 tablet (3 mg) by mouth nightly as needed for sleep        prenatal multivitamin plus iron 27-0.8 MG Tabs per tablet      Take 1 tablet by mouth daily

## 2018-04-12 NOTE — NURSING NOTE
Patient presents for OB check. She has concerns about feeling overwhelmed. Struggled with PP depression with her first child.    Stefania Burgos RN...................4/12/2018 2:20 PM

## 2018-04-13 ENCOUNTER — ANESTHESIA (OUTPATIENT)
Dept: SURGERY | Facility: OTHER | Age: 38
End: 2018-04-13
Payer: COMMERCIAL

## 2018-04-13 ENCOUNTER — HOSPITAL ENCOUNTER (INPATIENT)
Facility: OTHER | Age: 38
LOS: 4 days | Discharge: HOME | End: 2018-04-17
Attending: OBSTETRICS & GYNECOLOGY | Admitting: OBSTETRICS & GYNECOLOGY
Payer: COMMERCIAL

## 2018-04-13 ENCOUNTER — ANESTHESIA EVENT (OUTPATIENT)
Dept: SURGERY | Facility: OTHER | Age: 38
End: 2018-04-13
Payer: COMMERCIAL

## 2018-04-13 DIAGNOSIS — Z98.891 S/P CESAREAN SECTION: Primary | ICD-10-CM

## 2018-04-13 DIAGNOSIS — F41.8 POSTPARTUM ANXIETY: ICD-10-CM

## 2018-04-13 LAB
ABO + RH BLD: NORMAL
ABO + RH BLD: NORMAL
BASOPHILS # BLD AUTO: 0.1 10E9/L (ref 0–0.2)
BASOPHILS NFR BLD AUTO: 0.4 %
BLD GP AB SCN SERPL QL: NORMAL
BLOOD BANK CMNT PATIENT-IMP: NORMAL
DIFFERENTIAL METHOD BLD: ABNORMAL
EOSINOPHIL # BLD AUTO: 0.1 10E9/L (ref 0–0.7)
EOSINOPHIL NFR BLD AUTO: 1.2 %
ERYTHROCYTE [DISTWIDTH] IN BLOOD BY AUTOMATED COUNT: 15.2 % (ref 10–15)
HCT VFR BLD AUTO: 36.8 % (ref 35–47)
HGB BLD-MCNC: 12.3 G/DL (ref 11.7–15.7)
IMM GRANULOCYTES # BLD: 0.1 10E9/L (ref 0–0.4)
IMM GRANULOCYTES NFR BLD: 0.6 %
LYMPHOCYTES # BLD AUTO: 2 10E9/L (ref 0.8–5.3)
LYMPHOCYTES NFR BLD AUTO: 17.2 %
MCH RBC QN AUTO: 26.7 PG (ref 26.5–33)
MCHC RBC AUTO-ENTMCNC: 33.4 G/DL (ref 31.5–36.5)
MCV RBC AUTO: 80 FL (ref 78–100)
MONOCYTES # BLD AUTO: 0.6 10E9/L (ref 0–1.3)
MONOCYTES NFR BLD AUTO: 5 %
NEUTROPHILS # BLD AUTO: 8.6 10E9/L (ref 1.6–8.3)
NEUTROPHILS NFR BLD AUTO: 75.6 %
PLATELET # BLD AUTO: 295 10E9/L (ref 150–450)
RBC # BLD AUTO: 4.6 10E12/L (ref 3.8–5.2)
SPECIMEN EXP DATE BLD: NORMAL
WBC # BLD AUTO: 11.4 10E9/L (ref 4–11)

## 2018-04-13 PROCEDURE — 58611 LIGATE OVIDUCT(S) ADD-ON: CPT | Performed by: OBSTETRICS & GYNECOLOGY

## 2018-04-13 PROCEDURE — 59510 CESAREAN DELIVERY: CPT | Performed by: NURSE ANESTHETIST, CERTIFIED REGISTERED

## 2018-04-13 PROCEDURE — 36000058 ZZH SURGERY LEVEL 3 EA 15 ADDTL MIN: Performed by: OBSTETRICS & GYNECOLOGY

## 2018-04-13 PROCEDURE — 86900 BLOOD TYPING SEROLOGIC ABO: CPT | Performed by: OBSTETRICS & GYNECOLOGY

## 2018-04-13 PROCEDURE — 36000056 ZZH SURGERY LEVEL 3 1ST 30 MIN: Performed by: OBSTETRICS & GYNECOLOGY

## 2018-04-13 PROCEDURE — 85025 COMPLETE CBC W/AUTO DIFF WBC: CPT | Performed by: OBSTETRICS & GYNECOLOGY

## 2018-04-13 PROCEDURE — 37000009 ZZH ANESTHESIA TECHNICAL FEE, EACH ADDTL 15 MIN: Performed by: OBSTETRICS & GYNECOLOGY

## 2018-04-13 PROCEDURE — 59510 CESAREAN DELIVERY: CPT | Performed by: OBSTETRICS & GYNECOLOGY

## 2018-04-13 PROCEDURE — 27210794 ZZH OR GENERAL SUPPLY STERILE: Performed by: OBSTETRICS & GYNECOLOGY

## 2018-04-13 PROCEDURE — 25000132 ZZH RX MED GY IP 250 OP 250 PS 637: Performed by: OBSTETRICS & GYNECOLOGY

## 2018-04-13 PROCEDURE — 36415 COLL VENOUS BLD VENIPUNCTURE: CPT | Performed by: OBSTETRICS & GYNECOLOGY

## 2018-04-13 PROCEDURE — 88302 TISSUE EXAM BY PATHOLOGIST: CPT

## 2018-04-13 PROCEDURE — 37000008 ZZH ANESTHESIA TECHNICAL FEE, 1ST 30 MIN: Performed by: OBSTETRICS & GYNECOLOGY

## 2018-04-13 PROCEDURE — 25000128 H RX IP 250 OP 636: Performed by: OBSTETRICS & GYNECOLOGY

## 2018-04-13 PROCEDURE — 86850 RBC ANTIBODY SCREEN: CPT | Performed by: OBSTETRICS & GYNECOLOGY

## 2018-04-13 PROCEDURE — 86901 BLOOD TYPING SEROLOGIC RH(D): CPT | Performed by: OBSTETRICS & GYNECOLOGY

## 2018-04-13 PROCEDURE — 25000125 ZZHC RX 250: Performed by: OBSTETRICS & GYNECOLOGY

## 2018-04-13 PROCEDURE — 25000128 H RX IP 250 OP 636: Performed by: NURSE ANESTHETIST, CERTIFIED REGISTERED

## 2018-04-13 PROCEDURE — 99140 ANES COMP EMERGENCY COND: CPT | Performed by: NURSE ANESTHETIST, CERTIFIED REGISTERED

## 2018-04-13 PROCEDURE — 25000125 ZZHC RX 250: Performed by: NURSE ANESTHETIST, CERTIFIED REGISTERED

## 2018-04-13 PROCEDURE — 27210995 ZZH RX 272: Performed by: OBSTETRICS & GYNECOLOGY

## 2018-04-13 PROCEDURE — 0UT70ZZ RESECTION OF BILATERAL FALLOPIAN TUBES, OPEN APPROACH: ICD-10-PCS | Performed by: OBSTETRICS & GYNECOLOGY

## 2018-04-13 PROCEDURE — 86780 TREPONEMA PALLIDUM: CPT | Performed by: OBSTETRICS & GYNECOLOGY

## 2018-04-13 PROCEDURE — 88307 TISSUE EXAM BY PATHOLOGIST: CPT

## 2018-04-13 PROCEDURE — 71000014 ZZH RECOVERY PHASE 1 LEVEL 2 FIRST HR: Performed by: OBSTETRICS & GYNECOLOGY

## 2018-04-13 PROCEDURE — 12000027 ZZH R&B OB

## 2018-04-13 PROCEDURE — 71000015 ZZH RECOVERY PHASE 1 LEVEL 2 EA ADDTL HR: Performed by: OBSTETRICS & GYNECOLOGY

## 2018-04-13 RX ORDER — IBUPROFEN 600 MG/1
600 TABLET, FILM COATED ORAL EVERY 6 HOURS PRN
Status: DISCONTINUED | OUTPATIENT
Start: 2018-04-13 | End: 2018-04-17 | Stop reason: HOSPADM

## 2018-04-13 RX ORDER — NALOXONE HYDROCHLORIDE 0.4 MG/ML
.1-.4 INJECTION, SOLUTION INTRAMUSCULAR; INTRAVENOUS; SUBCUTANEOUS
Status: DISCONTINUED | OUTPATIENT
Start: 2018-04-13 | End: 2018-04-17 | Stop reason: HOSPADM

## 2018-04-13 RX ORDER — LIDOCAINE 40 MG/G
CREAM TOPICAL
Status: DISCONTINUED | OUTPATIENT
Start: 2018-04-13 | End: 2018-04-13 | Stop reason: HOSPADM

## 2018-04-13 RX ORDER — LANOLIN 100 %
OINTMENT (GRAM) TOPICAL
Status: DISCONTINUED | OUTPATIENT
Start: 2018-04-13 | End: 2018-04-17 | Stop reason: HOSPADM

## 2018-04-13 RX ORDER — ONDANSETRON 2 MG/ML
INJECTION INTRAMUSCULAR; INTRAVENOUS PRN
Status: DISCONTINUED | OUTPATIENT
Start: 2018-04-13 | End: 2018-04-13

## 2018-04-13 RX ORDER — NALOXONE HYDROCHLORIDE 0.4 MG/ML
.1-.4 INJECTION, SOLUTION INTRAMUSCULAR; INTRAVENOUS; SUBCUTANEOUS
Status: DISCONTINUED | OUTPATIENT
Start: 2018-04-13 | End: 2018-04-13

## 2018-04-13 RX ORDER — BISACODYL 10 MG
10 SUPPOSITORY, RECTAL RECTAL DAILY PRN
Status: DISCONTINUED | OUTPATIENT
Start: 2018-04-15 | End: 2018-04-16

## 2018-04-13 RX ORDER — FENTANYL CITRATE 50 UG/ML
50 INJECTION, SOLUTION INTRAMUSCULAR; INTRAVENOUS
Status: DISCONTINUED | OUTPATIENT
Start: 2018-04-13 | End: 2018-04-13 | Stop reason: HOSPADM

## 2018-04-13 RX ORDER — ONDANSETRON 4 MG/1
4 TABLET, ORALLY DISINTEGRATING ORAL EVERY 6 HOURS PRN
Status: DISCONTINUED | OUTPATIENT
Start: 2018-04-13 | End: 2018-04-17 | Stop reason: HOSPADM

## 2018-04-13 RX ORDER — NALBUPHINE HYDROCHLORIDE 20 MG/ML
2.5-5 INJECTION, SOLUTION INTRAMUSCULAR; INTRAVENOUS; SUBCUTANEOUS EVERY 6 HOURS PRN
Status: DISCONTINUED | OUTPATIENT
Start: 2018-04-13 | End: 2018-04-17 | Stop reason: HOSPADM

## 2018-04-13 RX ORDER — CEFAZOLIN SODIUM 2 G/100ML
2 INJECTION, SOLUTION INTRAVENOUS
Status: DISCONTINUED | OUTPATIENT
Start: 2018-04-13 | End: 2018-04-13 | Stop reason: HOSPADM

## 2018-04-13 RX ORDER — CEFAZOLIN SODIUM 1 G/3ML
INJECTION, POWDER, FOR SOLUTION INTRAMUSCULAR; INTRAVENOUS PRN
Status: DISCONTINUED | OUTPATIENT
Start: 2018-04-13 | End: 2018-04-13

## 2018-04-13 RX ORDER — ONDANSETRON 4 MG/1
4 TABLET, ORALLY DISINTEGRATING ORAL EVERY 30 MIN PRN
Status: DISCONTINUED | OUTPATIENT
Start: 2018-04-13 | End: 2018-04-13 | Stop reason: HOSPADM

## 2018-04-13 RX ORDER — OXYTOCIN 10 [USP'U]/ML
INJECTION, SOLUTION INTRAMUSCULAR; INTRAVENOUS PRN
Status: DISCONTINUED | OUTPATIENT
Start: 2018-04-13 | End: 2018-04-13

## 2018-04-13 RX ORDER — ONDANSETRON 2 MG/ML
4 INJECTION INTRAMUSCULAR; INTRAVENOUS EVERY 6 HOURS PRN
Status: DISCONTINUED | OUTPATIENT
Start: 2018-04-13 | End: 2018-04-17 | Stop reason: HOSPADM

## 2018-04-13 RX ORDER — PHENYLEPHRINE HCL IN 0.9% NACL 1 MG/10 ML
100 SYRINGE (ML) INTRAVENOUS EVERY 5 MIN PRN
Status: DISCONTINUED | OUTPATIENT
Start: 2018-04-13 | End: 2018-04-13

## 2018-04-13 RX ORDER — LIDOCAINE 40 MG/G
CREAM TOPICAL
Status: DISCONTINUED | OUTPATIENT
Start: 2018-04-13 | End: 2018-04-13

## 2018-04-13 RX ORDER — HYDROCORTISONE 2.5 %
CREAM (GRAM) TOPICAL 3 TIMES DAILY PRN
Status: DISCONTINUED | OUTPATIENT
Start: 2018-04-13 | End: 2018-04-17 | Stop reason: HOSPADM

## 2018-04-13 RX ORDER — KETOROLAC TROMETHAMINE 30 MG/ML
INJECTION, SOLUTION INTRAMUSCULAR; INTRAVENOUS PRN
Status: DISCONTINUED | OUTPATIENT
Start: 2018-04-13 | End: 2018-04-13

## 2018-04-13 RX ORDER — ONDANSETRON 2 MG/ML
4 INJECTION INTRAMUSCULAR; INTRAVENOUS EVERY 30 MIN PRN
Status: DISCONTINUED | OUTPATIENT
Start: 2018-04-13 | End: 2018-04-13 | Stop reason: HOSPADM

## 2018-04-13 RX ORDER — KETOROLAC TROMETHAMINE 30 MG/ML
30 INJECTION, SOLUTION INTRAMUSCULAR; INTRAVENOUS EVERY 6 HOURS
Status: DISPENSED | OUTPATIENT
Start: 2018-04-13 | End: 2018-04-14

## 2018-04-13 RX ORDER — SODIUM CHLORIDE, SODIUM LACTATE, POTASSIUM CHLORIDE, CALCIUM CHLORIDE 600; 310; 30; 20 MG/100ML; MG/100ML; MG/100ML; MG/100ML
INJECTION, SOLUTION INTRAVENOUS CONTINUOUS
Status: DISCONTINUED | OUTPATIENT
Start: 2018-04-13 | End: 2018-04-13 | Stop reason: HOSPADM

## 2018-04-13 RX ORDER — DEXAMETHASONE SODIUM PHOSPHATE 4 MG/ML
4 INJECTION, SOLUTION INTRA-ARTICULAR; INTRALESIONAL; INTRAMUSCULAR; INTRAVENOUS; SOFT TISSUE
Status: DISCONTINUED | OUTPATIENT
Start: 2018-04-13 | End: 2018-04-13 | Stop reason: HOSPADM

## 2018-04-13 RX ORDER — OXYCODONE HYDROCHLORIDE 5 MG/1
5 TABLET ORAL EVERY 4 HOURS PRN
Status: DISCONTINUED | OUTPATIENT
Start: 2018-04-13 | End: 2018-04-17 | Stop reason: HOSPADM

## 2018-04-13 RX ORDER — HYDROMORPHONE HYDROCHLORIDE 1 MG/ML
.3-.5 INJECTION, SOLUTION INTRAMUSCULAR; INTRAVENOUS; SUBCUTANEOUS EVERY 30 MIN PRN
Status: DISCONTINUED | OUTPATIENT
Start: 2018-04-13 | End: 2018-04-17 | Stop reason: HOSPADM

## 2018-04-13 RX ORDER — METOCLOPRAMIDE HYDROCHLORIDE 5 MG/ML
10 INJECTION INTRAMUSCULAR; INTRAVENOUS EVERY 6 HOURS PRN
Status: DISCONTINUED | OUTPATIENT
Start: 2018-04-13 | End: 2018-04-17 | Stop reason: HOSPADM

## 2018-04-13 RX ORDER — ONDANSETRON 4 MG/1
4 TABLET, ORALLY DISINTEGRATING ORAL EVERY 6 HOURS PRN
Status: DISCONTINUED | OUTPATIENT
Start: 2018-04-13 | End: 2018-04-13

## 2018-04-13 RX ORDER — OXYTOCIN 10 [USP'U]/ML
10 INJECTION, SOLUTION INTRAMUSCULAR; INTRAVENOUS
Status: DISCONTINUED | OUTPATIENT
Start: 2018-04-13 | End: 2018-04-13

## 2018-04-13 RX ORDER — IBUPROFEN 400 MG/1
800 TABLET, FILM COATED ORAL EVERY 6 HOURS PRN
Status: DISCONTINUED | OUTPATIENT
Start: 2018-04-13 | End: 2018-04-17 | Stop reason: HOSPADM

## 2018-04-13 RX ORDER — DIPHENHYDRAMINE HCL 25 MG
25 CAPSULE ORAL EVERY 6 HOURS PRN
Status: DISCONTINUED | OUTPATIENT
Start: 2018-04-13 | End: 2018-04-17 | Stop reason: HOSPADM

## 2018-04-13 RX ORDER — DEXTROSE, SODIUM CHLORIDE, SODIUM LACTATE, POTASSIUM CHLORIDE, AND CALCIUM CHLORIDE 5; .6; .31; .03; .02 G/100ML; G/100ML; G/100ML; G/100ML; G/100ML
INJECTION, SOLUTION INTRAVENOUS CONTINUOUS
Status: DISCONTINUED | OUTPATIENT
Start: 2018-04-13 | End: 2018-04-17 | Stop reason: HOSPADM

## 2018-04-13 RX ORDER — DOCUSATE SODIUM 100 MG/1
100 CAPSULE, LIQUID FILLED ORAL 2 TIMES DAILY
Status: DISCONTINUED | OUTPATIENT
Start: 2018-04-13 | End: 2018-04-17 | Stop reason: HOSPADM

## 2018-04-13 RX ORDER — MORPHINE SULFATE 1 MG/ML
INJECTION, SOLUTION EPIDURAL; INTRATHECAL; INTRAVENOUS PRN
Status: DISCONTINUED | OUTPATIENT
Start: 2018-04-13 | End: 2018-04-13

## 2018-04-13 RX ORDER — BUPIVACAINE HYDROCHLORIDE 7.5 MG/ML
INJECTION, SOLUTION INTRASPINAL PRN
Status: DISCONTINUED | OUTPATIENT
Start: 2018-04-13 | End: 2018-04-13

## 2018-04-13 RX ORDER — ONDANSETRON 2 MG/ML
4 INJECTION INTRAMUSCULAR; INTRAVENOUS EVERY 6 HOURS PRN
Status: DISCONTINUED | OUTPATIENT
Start: 2018-04-13 | End: 2018-04-13

## 2018-04-13 RX ORDER — CARBOPROST TROMETHAMINE 250 UG/ML
250 INJECTION, SOLUTION INTRAMUSCULAR
Status: DISCONTINUED | OUTPATIENT
Start: 2018-04-13 | End: 2018-04-17 | Stop reason: HOSPADM

## 2018-04-13 RX ORDER — FENTANYL CITRATE 50 UG/ML
25-50 INJECTION, SOLUTION INTRAMUSCULAR; INTRAVENOUS EVERY 5 MIN PRN
Status: DISCONTINUED | OUTPATIENT
Start: 2018-04-13 | End: 2018-04-13 | Stop reason: HOSPADM

## 2018-04-13 RX ORDER — NALOXONE HYDROCHLORIDE 0.4 MG/ML
.1-.4 INJECTION, SOLUTION INTRAMUSCULAR; INTRAVENOUS; SUBCUTANEOUS
Status: ACTIVE | OUTPATIENT
Start: 2018-04-13 | End: 2018-04-14

## 2018-04-13 RX ORDER — ALBUTEROL SULFATE 0.83 MG/ML
2.5 SOLUTION RESPIRATORY (INHALATION) EVERY 4 HOURS PRN
Status: DISCONTINUED | OUTPATIENT
Start: 2018-04-13 | End: 2018-04-13 | Stop reason: HOSPADM

## 2018-04-13 RX ORDER — CITRIC ACID/SODIUM CITRATE 334-500MG
30 SOLUTION, ORAL ORAL
Status: DISCONTINUED | OUTPATIENT
Start: 2018-04-13 | End: 2018-04-13 | Stop reason: HOSPADM

## 2018-04-13 RX ORDER — SODIUM CHLORIDE, SODIUM LACTATE, POTASSIUM CHLORIDE, CALCIUM CHLORIDE 600; 310; 30; 20 MG/100ML; MG/100ML; MG/100ML; MG/100ML
INJECTION, SOLUTION INTRAVENOUS CONTINUOUS PRN
Status: DISCONTINUED | OUTPATIENT
Start: 2018-04-13 | End: 2018-04-13

## 2018-04-13 RX ORDER — LIDOCAINE 40 MG/G
CREAM TOPICAL
Status: DISCONTINUED | OUTPATIENT
Start: 2018-04-13 | End: 2018-04-13 | Stop reason: CLARIF

## 2018-04-13 RX ORDER — MISOPROSTOL 100 UG/1
400 TABLET ORAL
Status: DISCONTINUED | OUTPATIENT
Start: 2018-04-13 | End: 2018-04-17 | Stop reason: HOSPADM

## 2018-04-13 RX ORDER — NALBUPHINE HYDROCHLORIDE 20 MG/ML
2.5-5 INJECTION, SOLUTION INTRAMUSCULAR; INTRAVENOUS; SUBCUTANEOUS EVERY 6 HOURS PRN
Status: DISCONTINUED | OUTPATIENT
Start: 2018-04-13 | End: 2018-04-13

## 2018-04-13 RX ORDER — HYDROMORPHONE HYDROCHLORIDE 1 MG/ML
.3-.5 INJECTION, SOLUTION INTRAMUSCULAR; INTRAVENOUS; SUBCUTANEOUS EVERY 5 MIN PRN
Status: DISCONTINUED | OUTPATIENT
Start: 2018-04-13 | End: 2018-04-13 | Stop reason: HOSPADM

## 2018-04-13 RX ORDER — MAGNESIUM HYDROXIDE 1200 MG/15ML
LIQUID ORAL PRN
Status: DISCONTINUED | OUTPATIENT
Start: 2018-04-13 | End: 2018-04-13 | Stop reason: HOSPADM

## 2018-04-13 RX ORDER — CEFAZOLIN SODIUM 1 G/50ML
1 INJECTION, SOLUTION INTRAVENOUS SEE ADMIN INSTRUCTIONS
Status: DISCONTINUED | OUTPATIENT
Start: 2018-04-13 | End: 2018-04-13 | Stop reason: HOSPADM

## 2018-04-13 RX ORDER — IBUPROFEN 400 MG/1
400 TABLET, FILM COATED ORAL EVERY 6 HOURS PRN
Status: DISCONTINUED | OUTPATIENT
Start: 2018-04-13 | End: 2018-04-17 | Stop reason: HOSPADM

## 2018-04-13 RX ORDER — METHYLERGONOVINE MALEATE 0.2 MG/ML
200 INJECTION INTRAVENOUS
Status: DISCONTINUED | OUTPATIENT
Start: 2018-04-13 | End: 2018-04-17 | Stop reason: HOSPADM

## 2018-04-13 RX ORDER — LIDOCAINE 40 MG/G
CREAM TOPICAL
Status: DISCONTINUED | OUTPATIENT
Start: 2018-04-13 | End: 2018-04-17 | Stop reason: HOSPADM

## 2018-04-13 RX ORDER — DIPHENHYDRAMINE HYDROCHLORIDE 50 MG/ML
25 INJECTION INTRAMUSCULAR; INTRAVENOUS EVERY 6 HOURS PRN
Status: DISCONTINUED | OUTPATIENT
Start: 2018-04-13 | End: 2018-04-17 | Stop reason: HOSPADM

## 2018-04-13 RX ORDER — FENTANYL CITRATE 50 UG/ML
INJECTION, SOLUTION INTRAMUSCULAR; INTRAVENOUS PRN
Status: DISCONTINUED | OUTPATIENT
Start: 2018-04-13 | End: 2018-04-13

## 2018-04-13 RX ORDER — OXYCODONE AND ACETAMINOPHEN 5; 325 MG/1; MG/1
1-2 TABLET ORAL EVERY 4 HOURS PRN
Status: DISCONTINUED | OUTPATIENT
Start: 2018-04-13 | End: 2018-04-17 | Stop reason: HOSPADM

## 2018-04-13 RX ORDER — SIMETHICONE 80 MG
80 TABLET,CHEWABLE ORAL 4 TIMES DAILY PRN
Status: DISCONTINUED | OUTPATIENT
Start: 2018-04-13 | End: 2018-04-15 | Stop reason: DRUGHIGH

## 2018-04-13 RX ORDER — BUPIVACAINE HYDROCHLORIDE 2.5 MG/ML
INJECTION, SOLUTION EPIDURAL; INFILTRATION; INTRACAUDAL PRN
Status: DISCONTINUED | OUTPATIENT
Start: 2018-04-13 | End: 2018-04-13 | Stop reason: HOSPADM

## 2018-04-13 RX ADMIN — OXYCODONE HYDROCHLORIDE AND ACETAMINOPHEN 2 TABLET: 5; 325 TABLET ORAL at 19:05

## 2018-04-13 RX ADMIN — KETOROLAC TROMETHAMINE 30 MG: 30 INJECTION, SOLUTION INTRAMUSCULAR at 09:58

## 2018-04-13 RX ADMIN — PHENYLEPHRINE HYDROCHLORIDE 5 MCG/KG/MIN: 10 INJECTION, SOLUTION INTRAMUSCULAR; INTRAVENOUS; SUBCUTANEOUS at 09:15

## 2018-04-13 RX ADMIN — Medication 125 ML: at 09:41

## 2018-04-13 RX ADMIN — SODIUM CHLORIDE, POTASSIUM CHLORIDE, SODIUM LACTATE AND CALCIUM CHLORIDE: 600; 310; 30; 20 INJECTION, SOLUTION INTRAVENOUS at 09:00

## 2018-04-13 RX ADMIN — DOCUSATE SODIUM 100 MG: 100 CAPSULE, LIQUID FILLED ORAL at 22:00

## 2018-04-13 RX ADMIN — BUPIVACAINE HYDROCHLORIDE IN DEXTROSE 1.6 ML: 7.5 INJECTION, SOLUTION SUBARACHNOID at 09:15

## 2018-04-13 RX ADMIN — KETOROLAC TROMETHAMINE 30 MG: 30 INJECTION, SOLUTION INTRAMUSCULAR at 22:01

## 2018-04-13 RX ADMIN — SODIUM CHLORIDE, SODIUM LACTATE, POTASSIUM CHLORIDE, CALCIUM CHLORIDE, AND DEXTROSE MONOHYDRATE: 600; 310; 30; 20; 5 INJECTION, SOLUTION INTRAVENOUS at 14:48

## 2018-04-13 RX ADMIN — DIPHENHYDRAMINE HYDROCHLORIDE 25 MG: 25 CAPSULE ORAL at 22:00

## 2018-04-13 RX ADMIN — NALBUPHINE HYDROCHLORIDE 2.5 MG: 20 INJECTION, SOLUTION INTRAMUSCULAR; INTRAVENOUS; SUBCUTANEOUS at 15:28

## 2018-04-13 RX ADMIN — OXYTOCIN 3 UNITS: 10 INJECTION, SOLUTION INTRAMUSCULAR; INTRAVENOUS at 10:09

## 2018-04-13 RX ADMIN — SODIUM CHLORIDE, SODIUM LACTATE, POTASSIUM CHLORIDE, CALCIUM CHLORIDE, AND DEXTROSE MONOHYDRATE: 600; 310; 30; 20; 5 INJECTION, SOLUTION INTRAVENOUS at 22:07

## 2018-04-13 RX ADMIN — OXYCODONE HYDROCHLORIDE AND ACETAMINOPHEN 2 TABLET: 5; 325 TABLET ORAL at 12:35

## 2018-04-13 RX ADMIN — KETOROLAC TROMETHAMINE 30 MG: 30 INJECTION, SOLUTION INTRAMUSCULAR at 16:26

## 2018-04-13 RX ADMIN — SODIUM CHLORIDE, SODIUM LACTATE, POTASSIUM CHLORIDE, AND CALCIUM CHLORIDE: 600; 310; 30; 20 INJECTION, SOLUTION INTRAVENOUS at 08:36

## 2018-04-13 RX ADMIN — OXYTOCIN 3 UNITS: 10 INJECTION, SOLUTION INTRAMUSCULAR; INTRAVENOUS at 09:38

## 2018-04-13 RX ADMIN — SIMETHICONE CHEW TAB 80 MG 80 MG: 80 TABLET ORAL at 19:05

## 2018-04-13 RX ADMIN — MORPHINE SULFATE 0.1 MG: 1 INJECTION, SOLUTION EPIDURAL; INTRATHECAL; INTRAVENOUS at 09:15

## 2018-04-13 RX ADMIN — FENTANYL CITRATE 50 MCG: 50 INJECTION INTRAMUSCULAR; INTRAVENOUS at 11:28

## 2018-04-13 RX ADMIN — SODIUM CHLORIDE, POTASSIUM CHLORIDE, SODIUM LACTATE AND CALCIUM CHLORIDE: 600; 310; 30; 20 INJECTION, SOLUTION INTRAVENOUS at 09:54

## 2018-04-13 RX ADMIN — ONDANSETRON 4 MG: 2 INJECTION INTRAMUSCULAR; INTRAVENOUS at 09:05

## 2018-04-13 RX ADMIN — CEFAZOLIN 2 G: 1 INJECTION, POWDER, FOR SOLUTION INTRAMUSCULAR; INTRAVENOUS at 09:00

## 2018-04-13 RX ADMIN — DOCUSATE SODIUM 100 MG: 100 CAPSULE, LIQUID FILLED ORAL at 16:26

## 2018-04-13 RX ADMIN — FENTANYL CITRATE 100 MCG: 50 INJECTION, SOLUTION INTRAMUSCULAR; INTRAVENOUS at 10:11

## 2018-04-13 ASSESSMENT — ACTIVITIES OF DAILY LIVING (ADL)
SWALLOWING: 0-->SWALLOWS FOODS/LIQUIDS WITHOUT DIFFICULTY
RETIRED_COMMUNICATION: 0-->UNDERSTANDS/COMMUNICATES WITHOUT DIFFICULTY
TOILETING: 0-->INDEPENDENT
COMMUNICATION: 0 - UNDERSTANDS/COMMUNICATES WITHOUT DIFFICULTY
DRESS: 0-->INDEPENDENT
RETIRED_EATING: 0-->INDEPENDENT
SWALLOWING: 0 - SWALLOWS FOODS/LIQUIDS WITHOUT DIFFICULTY
FALL_HISTORY_WITHIN_LAST_SIX_MONTHS: NO
TRANSFERRING: 0 - INDEPENDENT
COGNITION: 0 - NO COGNITION ISSUES REPORTED
DRESS: 0 - INDEPENDENT
BATHING: 0 - INDEPENDENT
TOILETING: 0 - INDEPENDENT
TRANSFERRING: 0-->INDEPENDENT
BATHING: 0-->INDEPENDENT
AMBULATION: 0-->INDEPENDENT
EATING: 0 - INDEPENDENT
AMBULATION: 0 - INDEPENDENT

## 2018-04-13 NOTE — ANESTHESIA PROCEDURE NOTES
Peripheral nerve/Neuraxial procedure note : intrathecal  Pre-Procedure  Performed by  MARIAM SYKES, in the presence of a teaching physician  BUZZ [facility]ATION  Location: OR    Procedure Times:4/13/2018 9:08 AM and 4/13/2018 9:15 AM  Pre-Anesthestic Checklist: patient identified, IV checked, site marked, risks and benefits discussed, informed consent, monitors and equipment checked, pre-op evaluation, at physician/surgeon's request and post-op pain management    Timeout  Correct Patient: Yes   Correct Procedure: Yes   Correct Site: Yes   Correct Laterality: N/A   Correct Position: Yes   Site Marked: N/A   .   Procedure Documentation  ASA 2  .    Procedure:    Intrathecal.  Insertion Site:L3-4  (midline approach)      Patient Prep;chlorhexidine gluconate and isopropyl alcohol.  .  Needle: Yandel tip Spinal Needle (gauge): 25  No introducer used .       Assessment/Narrative  Paresthesias: No.  .  .  clear CSF fluid removed while sitting   . Time Injected: 09:15  Sensory Level: T8

## 2018-04-13 NOTE — ANESTHESIA PREPROCEDURE EVALUATION
Anesthesia Evaluation     . Pt has had prior anesthetic. Type: MAC and General           ROS/MED HX    ENT/Pulmonary:  - neg pulmonary ROS     Neurologic:  - neg neurologic ROS     Cardiovascular:  - neg cardiovascular ROS       METS/Exercise Tolerance:     Hematologic:  - neg hematologic  ROS       Musculoskeletal:  - neg musculoskeletal ROS       GI/Hepatic:  - neg GI/hepatic ROS       Renal/Genitourinary:  - ROS Renal section negative       Endo:  - neg endo ROS       Psychiatric:  - neg psychiatric ROS       Infectious Disease:  - neg infectious disease ROS       Malignancy:      - no malignancy   Other:    (+) Possibly pregnant C-spine cleared: N/A, no H/O Chronic Pain,no other significant disability                    Physical Exam  Normal systems: cardiovascular, pulmonary and dental    Airway   Mallampati: II  TM distance: >3 FB  Neck ROM: full    Dental     Cardiovascular       Pulmonary                     Anesthesia Plan      History & Physical Review      ASA Status:  2 emergent.    NPO Status:  > 6 hours    Plan for Spinal   PONV prophylaxis:  Ondansetron (or other 5HT-3) and Dexamethasone or Solumedrol       Postoperative Care  Postoperative pain management:  Neuraxial analgesia.      Consents  Anesthetic plan, risks, benefits and alternatives discussed with: .  Use of blood products discussed: No .   .                         .

## 2018-04-13 NOTE — OP NOTE
[facility] Operative Note    Pre-operative diagnosis: Desire of Sterilization, History of  section, twin pregnancy   Post-operative diagnosis: Same, discordant birth weight   Procedure:  section and bilateral salpingectomy   Surgeon: Tu Mejia MD   Assistant(s): Tiffanie Arevalo MS3   Anesthesia: Spinal anesthesia   Estimated blood loss: 600ml   Total IV fluids: (See anesthesia record)   Blood transfusion: No transfusion was given during surgery   Total urine output: (See anesthesia record)   Drains: Pepper catheter   Specimens: Bilateral salpingectomy   Findings: Discordant birth weights   Complications: None   Condition: Stable      Procedure:   The patient was transferred to the OR where spinal anesthesia was accomplished.  A pepper catheter was inserted and clear urine was noted. The abdomen was scrubbed prepped and draped in the usual manner.  A hard stop timeout was accomplished.  A transverse Pfannenstiel incision was made and sharp and electrocautery dissection carried down to the fascial layer.  The Fascia was opened in the midline and extended bluntly bilaterally.  The rectus muscles were  in the midline bluntly.The peritoneum was bluntly divided and the Lisa ring retractor was placed.  A bladder flap was made sharply.  The lower uterine segment was incised sharply in a low transverse fashion and the first amniotic sac ruptured bluntly with a finger.  Clear fluid was noted and the incision extended bluntly.  The fetal breech was deliverd in the SA position. Pinard maneuver was performed to deliver the first shoulder, rotated and the second shoulder delivered easily. Mauriceau maneuver was performed for the chin delivering the head easily. The infant received nasal and oropharyngeal suction with the bulb suction.   The cord was clamped and cut after 30 seconds and the infant handed to Dr. Sibley who was asked to attend for early term  delivery of Twins by  due to increased risks of respiratory distress.     The second sac was sharply ruptured. The second baby presented double footling breech. The feet were grasped and the breech delivered to the thorax followed by Pinard and Mauriceau maneuver again. The cord was clamped and cut after 30 seconds and the second twin handed to Dr. Lang who was in attendance at my request for resuscitation of Twin B with known increased respiratory complication risks due to second twin at 37 weeks, breech and  delivery.    The placenta was manually removed and the uterus wiped clear of clots and debris. Pitocin was added to the IV infusion and the uterus was noted to firm-up nicely. The tubes and ovaries appeared normal. The uterine incision was closed in a double layer of #1 Chromic in a running fashion. Hemostasis was adequate. 3 additional figure of 8 sutures were placed for adequate hemostasis.    The left tube was exteriorized, the mesosalpinx divided with cautery at the left cornua. Three jenny clamps were placed across the entire mesosalpinx. The tube was excised in total and the pedicles free and stick tied for hemostasis laterally, with free ties on the medial two clamps. The left tube was handed off. Likewise the right tube was excised by dividing the mesosalpinx at the right cornua. Clamping the mesoalpinx in series and excising the tube. The pedicles were all free tied. The tube was handed off and both sent to pathology.    The abdomen irrigated clear of clots and debris.  The peritoneum and the rectus  muscles were approximated with  3 0 vicryl.  Additional figure of 8 suture was placed along the left rectus muscle for hemostasis with 2-0 vicryl. The fascia was closedwith 0 PDS.  The subcutaneous layer was irrigated and made hemastatic with electrocautery and additional figure of 8 stitch. It was closed in a single layer of 3 0 vicryl. The skin was closed with 3-0 monocryl. The wound  was dressed with steri-strips henrry pressure dressing.  Counts were correct times 2.  The patient and newborns were transferred to the recovery room in stable condition.

## 2018-04-13 NOTE — IP AVS SNAPSHOT
MRN:[id number]                      After Visit Summary   2018    Dafne Altman    MRN: [id number]           Thank you!     Thank you for choosing [facility] for your care. Our goal is always to provide you with excellent care. Hearing back from our patients is one way we can continue to improve our services. Please take a few minutes to complete the written survey that you may receive in the mail after you visit with us. Thank you!        Patient Information     Date Of Birth          1980        Designated Caregiver       Most Recent Value    Caregiver    Will someone help with your care after discharge? yes    Name of designated caregiver Carlo    Phone number of caregiver [phone]    Caregiver address [address] [place]      About your hospital stay     You were admitted on:  2018 You last received care in the:  [facility]    You were discharged on:  2018        Reason for your hospital stay       Maternity care                  Who to Call     For medical emergencies, please call 911.  For non-urgent questions about your medical care, please call your primary care provider or clinic, [phone]  For questions related to your surgery, please call your surgery clinic        Attending Provider     Provider Specialty    Tu Mejia MD OB/Gyn       Primary Care Provider Office Phone # Fax #    Milagro MATHIS Juan Robbins [phone]      After Care Instructions     Activity       Review discharge instructions            Diet       Resume previous diet            Discharge Instructions - Postpartum visit       Schedule postpartum visit with your provider and return to clinic in 6 weeks. Also 2 weeks if a  section was done.                  Follow-up Appointments     Follow Up and recommended labs and tests       Follow up with meTu, within 6 weeks. for hospital follow- up.  No follow up labs  "or test are needed.                  Your next 10 appointments already scheduled     Apr 18, 2018  1:00 PM CDT   Consult HOD with  LACTATION NURSE   [facility] ([facility])    [address]  [place] [address]   [phone]            Apr 30, 2018 12:45 PM CDT   SHORT with Tu Mejia MD   [facility] ([facility])    [address]  [place] [address]   [phone]            May 24, 2018 12:45 PM CDT   Post Partum with Tu Mejia MD   [facility] ([facility])    [address]  [place] [address]   [phone]              Pending Results     Date and Time Order Name Status Description    4/13/2018 0946 Surgical pathology exam In process             Statement of Approval     Ordered          04/17/18 0800  I have reviewed and agree with all the recommendations and orders detailed in this document.  EFFECTIVE NOW     Approved and electronically signed by:  Tu Mejia MD             Admission Information     Date & Time Provider Department Dept. Phone    4/13/2018 Tu Mejia MD [facility] [phone]      Your Vitals Were     Blood Pressure Pulse Temperature Respirations Height Weight    122/68 86 98.6  F (37  C) (Temporal) 20 1.651 m (5' 5\") 82.6 kg (182 lb)    Last Period Pulse Oximetry BMI (Body Mass Index)             07/28/2017 (LMP Unknown) 97% 30.29 kg/m2         [facility] Information     [facility] gives you secure access to your electronic health record. If you see a primary care provider, you can also send messages to your care team and make appointments. If you have questions, please call your primary care clinic.  If you do not have a primary care provider, please call [phone] and they will assist you.        Care EveryWhere ID     This is your Care EveryWhere ID. This could be used by other " organizations to access your [facility] medical records  [id number]        Equal Access to Services     LUIS FELIPE CLINE : Alida Do, sanket flanagan, ted ortiz, armen jorge. So [facility] [phone].    ATENCIÓN: Si habla español, tiene a alfonso disposición servicios gratuitos de asistencia lingüística. Llame al [phone].    We comply with applicable federal civil rights laws and [place] laws. We do not discriminate on the basis of race, color, national origin, age, disability, sex, sexual orientation, or gender identity.               Review of your medicines      START taking        Dose / Directions    docusate sodium 100 MG capsule   Commonly known as:  COLACE        Dose:  100 mg   Take 1 capsule (100 mg) by mouth 2 times daily   Quantity:  60 capsule   Refills:  0       FLUoxetine 10 MG capsule   Commonly known as:  PROzac   Used for:  Postpartum anxiety        Dose:  10 mg   Take 1 capsule (10 mg) by mouth daily   Quantity:  90 capsule   Refills:  1       ibuprofen 600 MG tablet   Commonly known as:  ADVIL/MOTRIN        Dose:  600 mg   Take 1 tablet (600 mg) by mouth every 6 hours as needed for other (carmping)   Quantity:  30 tablet   Refills:  0       oxyCODONE-acetaminophen 5-325 MG per tablet   Commonly known as:  PERCOCET        Dose:  1-2 tablet   Take 1-2 tablets by mouth every 4 hours as needed for other (pain control or improvement in physical function. Hold dose for analgesic side effects.)   Quantity:  20 tablet   Refills:  0         CONTINUE these medicines which have NOT CHANGED        Dose / Directions    ALGAL-900  MG Caps   Generic drug:  Docosahexaenoic Acid        Dose:  1 capsule   Take 1 capsule by mouth daily   Refills:  0       aluminum chloride 20 % external solution   Commonly known as:  DRYSOL        Apply topically daily as needed   Refills:  0       BENADRYL PO        Take by mouth nightly as needed   Refills:  0        ferrous sulfate 325 (65 Fe) MG Tbec EC tablet        Dose:  1 tablet   Take 1 tablet by mouth daily   Refills:  0       folic acid 1 MG tablet   Commonly known as:  FOLVITE        Dose:  1 tablet   Take 1 tablet by mouth daily   Refills:  0       melatonin 3 MG tablet        Dose:  3 mg   Take 1 tablet (3 mg) by mouth nightly as needed for sleep   Refills:  0       prenatal multivitamin plus iron 27-0.8 MG Tabs per tablet        Dose:  1 tablet   Take 1 tablet by mouth daily   Refills:  0         STOP taking     azithromycin 250 MG tablet   Commonly known as:  ZITHROMAX                Where to get your medicines      These medications were sent to [facility] [address] [address]     Phone:  [phone]     docusate sodium 100 MG capsule    FLUoxetine 10 MG capsule    ibuprofen 600 MG tablet         Some of these will need a paper prescription and others can be bought over the counter. Ask your nurse if you have questions.     Bring a paper prescription for each of these medications     oxyCODONE-acetaminophen 5-325 MG per tablet                Protect others around you: Learn how to safely use, store and throw away your medicines at www.disposemymeds.org.        Information about OPIOIDS     PRESCRIPTION OPIOIDS: WHAT YOU NEED TO KNOW    Prescription opioids can be used to help relieve moderate to severe pain and are often prescribed following a surgery or injury, or for certain health conditions. These medications can be an important part of treatment but also come with serious risks. It is important to work with your health care provider to make sure you are getting the safest, most effective care.    WHAT ARE THE RISKS AND SIDE EFFECTS OF OPIOID USE?  Prescription opioids carry serious risks of addiction and overdose, especially with prolonged use. An opioid overdose, often marked by slowed breathing can cause sudden death. The  use of prescription opioids can have a number of side effects as well, even when taken as directed:      Tolerance - meaning you might need to take more of a medication for the same pain relief    Physical dependence - meaning you have symptoms of withdrawal when a medication is stopped    Increased sensitivity to pain    Constipation    Nausea, vomiting, and dry mouth    Sleepiness and dizziness    Confusion    Depression    Low levels of testosterone that can result in lower sex drive, energy, and strength    Itching and sweating    RISKS ARE GREATER WITH:    History of drug misuse, substance use disorder, or overdose    Mental health conditions (such as depression or anxiety)    Sleep apnea    Older age (65 years or older)    Pregnancy    Avoid alcohol while taking prescription opioids.   Also, unless specifically advised by your health care provider, medications to avoid include:    Benzodiazepines (such as Xanax or Valium)    Muscle relaxants (such as Soma or Flexeril)    Hypnotics (such as Ambien or Lunesta)    Other prescription opioids    KNOW YOUR OPTIONS:  Talk to your health care provider about ways to manage your pain that do not involve prescription opioids. Some of these options may actually work better and have fewer risks and side effects:    Pain relievers such as acetaminophen, ibuprofen, and naproxen    Some medications that are also used for depression or seizures    Physical therapy and exercise    Cognitive behavioral therapy, a psychological, goal-directed approach, in which patients learn how to modify physical, behavioral, and emotional triggers of pain and stress    IF YOU ARE PRESCRIBED OPIOIDS FOR PAIN:    Never take opioids in greater amounts or more often than prescribed    Follow up with your primary health care provider and work together to create a plan on how to manage your pain.    Talk about ways to help manage your pain that do not involve prescription opioids    Talk about all  concerns and side effects    Help prevent misuse and abuse    Never sell or share prescription opioids    Never use another person's prescription opioids    Store prescription opioids in a secure place and out of reach of others (this may include visitors, children, friends, and family)    Visit www.cdc.gov/drugoverdose to learn about risks of opioid abuse and overdose    If you believe you may be struggling with addiction, tell your health care provider and ask for guidance or call [facility]'s National Helpline at 7-938-920-HELP    LEARN MORE / www.cdc.gov/drugoverdose/prescribing/guideline.html    Safely dispose of unused prescription opioids: Find your local drug take-back programs and more information about the importance of safe disposal at www.doseofreality.[place].gov             Medication List: This is a list of all your medications and when to take them. Check marks below indicate your daily home schedule. Keep this list as a reference.      Medications           Morning Afternoon Evening Bedtime As Needed    ALGAL-900  MG Caps   Take 1 capsule by mouth daily   Generic drug:  Docosahexaenoic Acid                                aluminum chloride 20 % external solution   Commonly known as:  DRYSOL   Apply topically daily as needed                                BENADRYL PO   Take by mouth nightly as needed                                docusate sodium 100 MG capsule   Commonly known as:  COLACE   Take 1 capsule (100 mg) by mouth 2 times daily   Last time this was given:  100 mg on 4/17/2018  9:23 AM                                ferrous sulfate 325 (65 Fe) MG Tbec EC tablet   Take 1 tablet by mouth daily                                FLUoxetine 10 MG capsule   Commonly known as:  PROzac   Take 1 capsule (10 mg) by mouth daily   Last time this was given:  10 mg on 4/17/2018 10:32 AM                                folic acid 1 MG tablet   Commonly known as:  FOLVITE   Take 1 tablet by mouth daily                                 ibuprofen 600 MG tablet   Commonly known as:  ADVIL/MOTRIN   Take 1 tablet (600 mg) by mouth every 6 hours as needed for other (carmping)   Last time this was given:  800 mg on 4/17/2018 12:58 AM                                melatonin 3 MG tablet   Take 1 tablet (3 mg) by mouth nightly as needed for sleep                                oxyCODONE-acetaminophen 5-325 MG per tablet   Commonly known as:  PERCOCET   Take 1-2 tablets by mouth every 4 hours as needed for other (pain control or improvement in physical function. Hold dose for analgesic side effects.)   Last time this was given:  2 tablets on 4/17/2018  9:23 AM                                prenatal multivitamin plus iron 27-0.8 MG Tabs per tablet   Take 1 tablet by mouth daily

## 2018-04-13 NOTE — ANESTHESIA POSTPROCEDURE EVALUATION
Patient: Dafne Altman    Procedure(s):   Section - Wound Class: II-Clean Contaminated    Diagnosis:Desire of Sterilization, History of  section  Diagnosis Additional Information: No value filed.    Anesthesia Type:  Spinal    Note:  Anesthesia Post Evaluation    Patient location during evaluation: OB PACU  Patient participation: Able to fully participate in evaluation  Level of consciousness: awake and alert  Pain management: adequate  Airway patency: patent  Cardiovascular status: acceptable  Respiratory status: acceptable  Hydration status: acceptable  PONV: none             Last vitals:  Vitals:    18 1140 18 1145 18 1150   BP: 136/81 153/81 105/74   Pulse:      Resp: 28 8 14   Temp:  98.4  F (36.9  C)    SpO2: 97% 96% 98%         Electronically Signed By: JER DE PAZ CRNA  2018  12:02 PM

## 2018-04-13 NOTE — IP AVS SNAPSHOT
[facility]    [address]    [place] [address]    Phone:  [phone]    Fax:  [phone]                                       After Visit Summary   4/13/2018    Dafne Altman    MRN: [id number]           After Visit Summary Signature Page     I have received my discharge instructions, and my questions have been answered. I have discussed any challenges I see with this plan with the nurse or doctor.    ..........................................................................................................................................  Patient/Patient Representative Signature      ..........................................................................................................................................  Patient Representative Print Name and Relationship to Patient    ..................................................               ................................................  Date                                            Time    ..........................................................................................................................................  Reviewed by Signature/Title    ...................................................              ..............................................  Date                                                            Time

## 2018-04-13 NOTE — H&P
[facility]    History and Physical  Obstetrics and Gynecology     Date of Admission:  2018    Assessment & Plan   Dafne Altman is a 38 year old female who presents with repeat  section with tubal ligation for twins and preeclampsia.  ASSESSMENT:   IUP @ 37w0d with preeclampsia, twins.  NST reactive.  Category  I for both twins    PLAN:   Admit - see IP orders    Tu Mejia    History of Present Illness   Dafne Altman is a 38 year old female  37w0d  Estimated Date of Delivery: May 4, 2018 is calculated from Patient's last menstrual period was 2017 (lmp unknown). [facility]  For repeat  with preeclampsia and twins.    PRENATAL COURSE  Prenatal course was complicated by twins and AMA, with history of , maternal depression.      Recent Labs   Lab Test 17   ABO  O   RH  Pos     Rhogam not indicated   No lab results found.    Past Medical History    I have reviewed this patient's medical history and updated it with pertinent information if needed.   Past Medical History:   Diagnosis Date     Dichorionic diamniotic twin pregnancy     2017,     Nonspecific reaction to tuberculin skin test without active tuberculosis     No Comments Provided     Other mental disorders complicating the puerperium     5/15/2016     Primary focal hyperhidrosis of axilla     2017       Past Surgical History   I have reviewed this patient's surgical history and updated it with pertinent information if needed.  Past Surgical History:   Procedure Laterality Date     COLONOSCOPY      No Comments Provided     OTHER SURGICAL HISTORY      ,CYSTECTOMY,Left,Axillary area     OTHER SURGICAL HISTORY      3/29/2016,YSS067, SECTION, LOW TRANSVERSE,recurrent decels       Prior to Admission Medications   Prior to Admission Medications   Prescriptions Last Dose Informant Patient Reported? Taking?   DiphenhydrAMINE HCl (BENADRYL PO)   Yes Yes    Sig: Take by mouth nightly as needed   Docosahexaenoic Acid (ALGAL-900 DHA) 450 MG CAPS   Yes No   Sig: Take 1 capsule by mouth daily   Prenatal Vit-Fe Fumarate-FA (PRENATAL MULTIVITAMIN PLUS IRON) 27-0.8 MG TABS per tablet   Yes No   Sig: Take 1 tablet by mouth daily   aluminum chloride (DRYSOL) 20 % external solution   Yes No   Sig: Apply topically daily as needed   azithromycin (ZITHROMAX) 250 MG tablet   No No   Sig: Two tablets first day, then one tablet daily for four days.   ferrous sulfate 325 (65 FE) MG TBEC EC tablet   Yes No   Sig: Take 1 tablet by mouth daily   folic acid (FOLVITE) 1 MG tablet   Yes No   Sig: Take 1 tablet by mouth daily   melatonin 3 MG tablet   Yes No   Sig: Take 1 tablet (3 mg) by mouth nightly as needed for sleep      Facility-Administered Medications: None     Allergies   No Known Allergies    Social History   I have reviewed this patient's social history and updated it with pertinent information if needed. Dafne Altman  reports that she has never smoked. She has never used smokeless tobacco. She reports that she drinks about 2.4 oz of alcohol per week  She reports that she does not use illicit drugs.    Family History   I have reviewed this patient's family history and updated it with pertinent information if needed.   Family History   Problem Relation Age of Onset     DIABETES Mother      Diabetes     Hypertension Father      Hypertension     Other - See Comments Sister      PCOS     DIABETES Sister      Diabetes     DIABETES Sister      Diabetes       Immunization History   Immunizations are up to date    Physical Exam   Temp: 98.1  F (36.7  C) Temp src: Oral BP: 132/78 Pulse: 78   Resp: 18 SpO2: 97 %      Vital Signs with Ranges  Temp:  [98.1  F (36.7  C)] 98.1  F (36.7  C)  Pulse:  [78] 78  Resp:  [18] 18  BP: (132-142)/(78-82) 132/78  SpO2:  [97 %] 97 %    Abdomen: gravid, non-tender,  Constitutional: healthy, alert and active   Respiratory: No increased work of breathing,  good air exchange, clear to auscultation bilaterally, no crackles or wheezing  Cardiovascular: normal S1 and S2  Skin/Extremites: no rashes and no lesions

## 2018-04-14 LAB
HGB BLD-MCNC: 10.9 G/DL (ref 11.7–15.7)
T PALLIDUM IGG+IGM SER QL: NEGATIVE

## 2018-04-14 PROCEDURE — 25000128 H RX IP 250 OP 636: Performed by: NURSE ANESTHETIST, CERTIFIED REGISTERED

## 2018-04-14 PROCEDURE — 36415 COLL VENOUS BLD VENIPUNCTURE: CPT | Performed by: OBSTETRICS & GYNECOLOGY

## 2018-04-14 PROCEDURE — 12000027 ZZH R&B OB

## 2018-04-14 PROCEDURE — 25000132 ZZH RX MED GY IP 250 OP 250 PS 637: Performed by: OBSTETRICS & GYNECOLOGY

## 2018-04-14 PROCEDURE — 25000132 ZZH RX MED GY IP 250 OP 250 PS 637: Performed by: FAMILY MEDICINE

## 2018-04-14 PROCEDURE — 25000128 H RX IP 250 OP 636: Performed by: OBSTETRICS & GYNECOLOGY

## 2018-04-14 PROCEDURE — 85018 HEMOGLOBIN: CPT | Performed by: OBSTETRICS & GYNECOLOGY

## 2018-04-14 RX ORDER — POLYETHYLENE GLYCOL 3350 17 G/17G
17 POWDER, FOR SOLUTION ORAL DAILY
Status: DISCONTINUED | OUTPATIENT
Start: 2018-04-14 | End: 2018-04-17 | Stop reason: HOSPADM

## 2018-04-14 RX ORDER — SENNOSIDES 8.6 MG
8.6 TABLET ORAL 2 TIMES DAILY PRN
Status: DISCONTINUED | OUTPATIENT
Start: 2018-04-14 | End: 2018-04-17 | Stop reason: HOSPADM

## 2018-04-14 RX ADMIN — KETOROLAC TROMETHAMINE 30 MG: 30 INJECTION, SOLUTION INTRAMUSCULAR at 04:23

## 2018-04-14 RX ADMIN — SENNOSIDES 8.6 MG: 8.6 TABLET, FILM COATED ORAL at 11:24

## 2018-04-14 RX ADMIN — DOCUSATE SODIUM 100 MG: 100 CAPSULE, LIQUID FILLED ORAL at 08:35

## 2018-04-14 RX ADMIN — SIMETHICONE CHEW TAB 80 MG 80 MG: 80 TABLET ORAL at 08:35

## 2018-04-14 RX ADMIN — SIMETHICONE CHEW TAB 80 MG 80 MG: 80 TABLET ORAL at 11:23

## 2018-04-14 RX ADMIN — OXYCODONE HYDROCHLORIDE AND ACETAMINOPHEN 2 TABLET: 5; 325 TABLET ORAL at 15:42

## 2018-04-14 RX ADMIN — POLYETHYLENE GLYCOL (3350) 17 G: 17 POWDER, FOR SOLUTION ORAL at 11:24

## 2018-04-14 RX ADMIN — IBUPROFEN 400 MG: 400 TABLET ORAL at 11:23

## 2018-04-14 RX ADMIN — DIPHENHYDRAMINE HYDROCHLORIDE 25 MG: 25 CAPSULE ORAL at 14:11

## 2018-04-14 RX ADMIN — IBUPROFEN 800 MG: 400 TABLET ORAL at 17:58

## 2018-04-14 RX ADMIN — OXYCODONE HYDROCHLORIDE AND ACETAMINOPHEN 2 TABLET: 5; 325 TABLET ORAL at 08:35

## 2018-04-14 RX ADMIN — OXYCODONE HYDROCHLORIDE AND ACETAMINOPHEN 2 TABLET: 5; 325 TABLET ORAL at 21:53

## 2018-04-14 RX ADMIN — NALBUPHINE HYDROCHLORIDE 2.5 MG: 20 INJECTION, SOLUTION INTRAMUSCULAR; INTRAVENOUS; SUBCUTANEOUS at 04:23

## 2018-04-14 RX ADMIN — DOCUSATE SODIUM 100 MG: 100 CAPSULE, LIQUID FILLED ORAL at 21:53

## 2018-04-14 NOTE — PLAN OF CARE
Problem: Patient Care Overview  Goal: Plan of Care/Patient Progress Review  Outcome: Improving  Assessments as charted. B/P: 135/67, T: 97.7, P: 72, R: 18. Rates pain: 2/10 with alternating Percocet and Ibuprofen every 3 hours- see MAR. Incision: Healing well, well approximated, without signs of infection and no drainage. Voiding without difficulty. Fundus firm at U/1. Lochia: small. Activity: unrestricted, with minimal pain . Infant feeding: Both breast milk and formula, only as indicated, for 37-week-gestation twin supplementation.     Twin A ([place]) LATCH Score:   Latch: 2 - Good Latch  Audible Swallowin - Spontaneous & frequent  Type of Nipple: (Breast/Nipple) 2 - Everted  Comfort: 2 - Soft, Nontender  Hold: 1 - Min. Assist   Total LATCH Score: 9    Twin B ([place]) did not sustain an effective breastfeeding this afternoon, so mom opted to pump and finger feed breast milk and formula.    Postpartum 37-week-gestation twins breastfeeding assessment completed and education provided, see Patient Education Activity.  Items included in the education are:     proper positioning and latch    effectiveness of feeding    manual expression    handling and storing breastmilk    maintenance of breastfeeding for the first 6 months    sign/symptoms of infant feeding issues requiring referral to qualified health care provider    Postpartum care education provided, see Patient Education activity. Patient denies needs. Will monitor.  Latricia Murillo, RN, IBCLC

## 2018-04-14 NOTE — PLAN OF CARE
Problem: Patient Care Overview  Goal: Plan of Care/Patient Progress Review  Outcome: Improving   04/14/18 0009   OTHER   Plan Of Care Reviewed With patient   Plan of Care Review   Progress improving   Assessments as charted. B/P: 120/75, T: 98.3, P: 72, R: 18. Rates pain: 1-2/10 pt reports that pain is being well controlled with PRN pain medication and scheduled IV Toradol, see MAR. Incision: dressing CDI. Voiding without difficulty. Fundus firm, midline, U/1. Lochia: Light. Activity: unrestricted with out pain  and pt up and walking at start of shift and has been repositioning herself in bed. SCD's on while in bed. Infant feeding: Breast feeding going well, pt pumping and finger feeding. Postpartum breastfeeding assessment completed and education provided, see Patient Education Activity.  Items included in the education are:     proper positioning and latch    effectiveness of feeding    manual expression    handling and storing breastmilk    maintenance of breastfeeding for the first 6 months    sign/symptoms of infant feeding issues requiring referral to qualified health care provider  Postpartum care education provided, see Patient Education activity. Patient denies any needs at this time. Will continue to monitor.  Lorraine Mccormack      Problem: Breastfeeding (Adult,Obstetrics,Pediatric)  Goal: Signs and Symptoms of Listed Potential Problems Will be Absent, Minimized or Managed (Breastfeeding)  Signs and symptoms of listed potential problems will be absent, minimized or managed by discharge/transition of care (reference Breastfeeding (Adult,Obstetrics,Pediatric) CPG).    04/14/18 0009   Breastfeeding   Problems Assessed (Breastfeeding) all   Problems Present (Breastfeeding) none     Pt has been pumping and finger feeding babies, but would like to try to get them both on breast for the next feeding. Pt is concerned that babies are not getting enough with each feeding and states that she is not opposed to  supplementation if it is needed. Pt has been pumping 10 ml during each session and babies are fed 5 ml each and tolerate well.     Problem: Postpartum ( Delivery) (Adult,Obstetrics,Pediatric)  Goal: Signs and Symptoms of Listed Potential Problems Will be Absent, Minimized or Managed (Postpartum)  Signs and symptoms of listed potential problems will be absent, minimized or managed by discharge/transition of care (reference Postpartum ( Delivery) (Adult,Obstetrics,Pediatric) CPG).  Outcome: Improving   18 0009   Postpartum ( Delivery)   Problems Assessed (Postpartum ) all   Problems Present (Postpartum ) pain

## 2018-04-14 NOTE — PROGRESS NOTES
Incentive Spirometry education completed.  Pt goal 2500 mls.  Pt achieved 1500 mls.  Pt instructed to perform 10/hr while awake with at least one deep breath and cough per hour until able to perform baseline activity.  RT will follow and re-assess as need.      Bea Alan, RT on 4/13/2018 at 7:19 PM

## 2018-04-14 NOTE — PROGRESS NOTES
[facility]    Post-Partum Progress Note    Assessment & Plan   Assessment:  Post-partum day #1  Normal spontaneous vaginal delivery   and sterilization.    Doing well.  Clean wound without signs of infection.  Pain well-controlled.    Plan:  Ambulation encouraged  Remove catheter.  Saline lock.  Shower prn.    Anticipate discharge in 2 days    Joshua Freire     Interval History   Doing well.  Pain is well-controlled.  No fevers.  No history of foul-smelling vaginal discharge.  Good appetite.  Denies chest pain, shortness of breath, nausea or vomiting.  Vaginal bleeding is similar to a heavy menstrual flow.  Ambulatory.  Breastfeeding well.    Medications     - MEDICATION INSTRUCTIONS -       - MEDICATION INSTRUCTIONS -       oxytocin in 0.9% NaCl       dextrose 5% lactated ringers Stopped (18 0430)     - MEDICATION INSTRUCTIONS -       - MEDICATION INSTRUCTIONS -       NO Rho (D) immune globulin (RhoGam) needed - mother Rh POSITIVE         sodium chloride (PF)  3 mL Intracatheter Q8H     ketorolac  30 mg Intravenous Q6H     docusate sodium  100 mg Oral BID       Physical Exam   Temp: 98.3  F (36.8  C) Temp src: Temporal BP: 120/75 Pulse: 72 Heart Rate: 75 Resp: 18 SpO2: 98 %      Vitals:    18 0731   Weight: 82.6 kg (182 lb)     Vital Signs with Ranges  Temp:  [98.2  F (36.8  C)-98.4  F (36.9  C)] 98.3  F (36.8  C)  Pulse:  [57-87] 72  Heart Rate:  [58-80] 75  Resp:  [8-59] 18  BP: (105-153)/(58-99) 120/75  SpO2:  [96 %-98 %] 98 %  I/O last 3 completed shifts:  In: 6031 [P.O.:4100; I.V.:193]  Out: 6100 [Urine:6100]    Urethral Catheter Non-latex 16 fr (Active)   Catheter Care Done 2018  4:42 AM   Collection Container Standard 2018  4:42 AM   Securement Method Leg strap 2018  4:42 AM   Rationale for Continued Use /GI/GYN Pelvic Procedure 2018  4:42 AM   Urine Output 2000 mL 2018  4:42 AM   Number of days:1       Incision/Surgical Site 18  Abdomen (Active)   Incision Assessment WDL 4/13/2018  4:18 PM   Closure Adhesive strip(s);Approximated;Sutures 4/13/2018 10:37 AM   Dressing Intervention Clean, dry, intact 4/13/2018 10:55 AM   Number of days:1     Plan to discontinue (See Orders)    Uterine fundus is firm, non-tender and at the level of the umbilicus  Incision C/D/I  Extremities Non-tender  Heart is regular rate and rhythm and lungs clear to auscultation    Data   Recent Labs   Lab Test  04/13/18   0800   ABO  O   RH  Pos   AS  Neg     Recent Labs   Lab Test  04/14/18   0640  04/13/18   0800   HGB  10.9*  12.3     No lab results found.

## 2018-04-15 LAB
ALBUMIN SERPL-MCNC: 3 G/DL (ref 3.5–5.7)
ALP SERPL-CCNC: 116 U/L (ref 34–104)
ALT SERPL W P-5'-P-CCNC: 19 U/L (ref 7–52)
AMYLASE SERPL-CCNC: 40 U/L (ref 29–103)
ANION GAP SERPL CALCULATED.3IONS-SCNC: 8 MMOL/L (ref 3–14)
AST SERPL W P-5'-P-CCNC: 26 U/L (ref 13–39)
BASOPHILS # BLD AUTO: 0 10E9/L (ref 0–0.2)
BASOPHILS NFR BLD AUTO: 0.2 %
BILIRUB SERPL-MCNC: 0.2 MG/DL (ref 0.3–1)
BUN SERPL-MCNC: 8 MG/DL (ref 7–25)
CALCIUM SERPL-MCNC: 9.4 MG/DL (ref 8.6–10.3)
CHLORIDE SERPL-SCNC: 104 MMOL/L (ref 98–107)
CO2 SERPL-SCNC: 23 MMOL/L (ref 21–31)
CREAT SERPL-MCNC: 0.63 MG/DL (ref 0.6–1.2)
DIFFERENTIAL METHOD BLD: ABNORMAL
EOSINOPHIL # BLD AUTO: 0.1 10E9/L (ref 0–0.7)
EOSINOPHIL NFR BLD AUTO: 0.8 %
ERYTHROCYTE [DISTWIDTH] IN BLOOD BY AUTOMATED COUNT: 15 % (ref 10–15)
ERYTHROCYTE [SEDIMENTATION RATE] IN BLOOD BY WESTERGREN METHOD: 103 MM/H (ref 1–15)
GFR SERPL CREATININE-BSD FRML MDRD: >90 ML/MIN/1.7M2
GLUCOSE SERPL-MCNC: 100 MG/DL (ref 70–105)
HCT VFR BLD AUTO: 32 % (ref 35–47)
HGB BLD-MCNC: 10.8 G/DL (ref 11.7–15.7)
IMM GRANULOCYTES # BLD: 0.1 10E9/L (ref 0–0.4)
IMM GRANULOCYTES NFR BLD: 0.4 %
LIPASE SERPL-CCNC: 12 U/L (ref 11–82)
LYMPHOCYTES # BLD AUTO: 1.4 10E9/L (ref 0.8–5.3)
LYMPHOCYTES NFR BLD AUTO: 8.4 %
MCH RBC QN AUTO: 26.7 PG (ref 26.5–33)
MCHC RBC AUTO-ENTMCNC: 33.8 G/DL (ref 31.5–36.5)
MCV RBC AUTO: 79 FL (ref 78–100)
MONOCYTES # BLD AUTO: 0.7 10E9/L (ref 0–1.3)
MONOCYTES NFR BLD AUTO: 4.3 %
NEUTROPHILS # BLD AUTO: 14.3 10E9/L (ref 1.6–8.3)
NEUTROPHILS NFR BLD AUTO: 85.9 %
PLATELET # BLD AUTO: 299 10E9/L (ref 150–450)
POTASSIUM SERPL-SCNC: 4.2 MMOL/L (ref 3.5–5.1)
PROT SERPL-MCNC: 6.4 G/DL (ref 6.4–8.9)
RBC # BLD AUTO: 4.04 10E12/L (ref 3.8–5.2)
SODIUM SERPL-SCNC: 135 MMOL/L (ref 134–144)
WBC # BLD AUTO: 16.7 10E9/L (ref 4–11)

## 2018-04-15 PROCEDURE — 25000125 ZZHC RX 250: Performed by: NURSE ANESTHETIST, CERTIFIED REGISTERED

## 2018-04-15 PROCEDURE — 25000132 ZZH RX MED GY IP 250 OP 250 PS 637: Performed by: FAMILY MEDICINE

## 2018-04-15 PROCEDURE — 80053 COMPREHEN METABOLIC PANEL: CPT | Performed by: OBSTETRICS & GYNECOLOGY

## 2018-04-15 PROCEDURE — 25000132 ZZH RX MED GY IP 250 OP 250 PS 637: Performed by: OBSTETRICS & GYNECOLOGY

## 2018-04-15 PROCEDURE — 85025 COMPLETE CBC W/AUTO DIFF WBC: CPT | Performed by: OBSTETRICS & GYNECOLOGY

## 2018-04-15 PROCEDURE — 36415 COLL VENOUS BLD VENIPUNCTURE: CPT | Performed by: OBSTETRICS & GYNECOLOGY

## 2018-04-15 PROCEDURE — 82150 ASSAY OF AMYLASE: CPT | Performed by: OBSTETRICS & GYNECOLOGY

## 2018-04-15 PROCEDURE — 25000132 ZZH RX MED GY IP 250 OP 250 PS 637: Performed by: ANESTHESIOLOGY

## 2018-04-15 PROCEDURE — 12000027 ZZH R&B OB

## 2018-04-15 PROCEDURE — 85652 RBC SED RATE AUTOMATED: CPT | Performed by: OBSTETRICS & GYNECOLOGY

## 2018-04-15 PROCEDURE — 40000275 ZZH STATISTIC RCP TIME EA 10 MIN

## 2018-04-15 PROCEDURE — 83690 ASSAY OF LIPASE: CPT | Performed by: OBSTETRICS & GYNECOLOGY

## 2018-04-15 RX ORDER — BISACODYL 10 MG
10 SUPPOSITORY, RECTAL RECTAL DAILY PRN
Status: DISCONTINUED | OUTPATIENT
Start: 2018-04-15 | End: 2018-04-17 | Stop reason: HOSPADM

## 2018-04-15 RX ORDER — SIMETHICONE 80 MG
80 TABLET,CHEWABLE ORAL 4 TIMES DAILY
Status: DISCONTINUED | OUTPATIENT
Start: 2018-04-15 | End: 2018-04-17 | Stop reason: HOSPADM

## 2018-04-15 RX ADMIN — OXYCODONE HYDROCHLORIDE AND ACETAMINOPHEN 2 TABLET: 5; 325 TABLET ORAL at 07:21

## 2018-04-15 RX ADMIN — OXYCODONE HYDROCHLORIDE 5 MG: 5 TABLET ORAL at 12:13

## 2018-04-15 RX ADMIN — Medication: at 14:55

## 2018-04-15 RX ADMIN — IBUPROFEN 400 MG: 400 TABLET ORAL at 10:49

## 2018-04-15 RX ADMIN — SIMETHICONE CHEW TAB 80 MG 80 MG: 80 TABLET ORAL at 22:03

## 2018-04-15 RX ADMIN — OXYCODONE HYDROCHLORIDE AND ACETAMINOPHEN 2 TABLET: 5; 325 TABLET ORAL at 22:02

## 2018-04-15 RX ADMIN — OXYCODONE HYDROCHLORIDE 5 MG: 5 TABLET ORAL at 01:08

## 2018-04-15 RX ADMIN — SIMETHICONE CHEW TAB 80 MG 80 MG: 80 TABLET ORAL at 14:56

## 2018-04-15 RX ADMIN — SIMETHICONE CHEW TAB 80 MG 80 MG: 80 TABLET ORAL at 04:04

## 2018-04-15 RX ADMIN — ONDANSETRON 4 MG: 4 TABLET, ORALLY DISINTEGRATING ORAL at 12:13

## 2018-04-15 RX ADMIN — BISACODYL 10 MG: 10 SUPPOSITORY RECTAL at 07:54

## 2018-04-15 RX ADMIN — SIMETHICONE CHEW TAB 80 MG 80 MG: 80 TABLET ORAL at 17:48

## 2018-04-15 RX ADMIN — IBUPROFEN 800 MG: 400 TABLET ORAL at 17:49

## 2018-04-15 RX ADMIN — IBUPROFEN 800 MG: 400 TABLET ORAL at 04:54

## 2018-04-15 RX ADMIN — DOCUSATE SODIUM 100 MG: 100 CAPSULE, LIQUID FILLED ORAL at 07:20

## 2018-04-15 RX ADMIN — BISACODYL 10 MG: 10 SUPPOSITORY RECTAL at 13:50

## 2018-04-15 RX ADMIN — DOCUSATE SODIUM 100 MG: 100 CAPSULE, LIQUID FILLED ORAL at 22:03

## 2018-04-15 RX ADMIN — MAGNESIUM HYDROXIDE 30 ML: 400 SUSPENSION ORAL at 19:25

## 2018-04-15 RX ADMIN — DIPHENHYDRAMINE HYDROCHLORIDE 25 MG: 25 CAPSULE ORAL at 08:11

## 2018-04-15 RX ADMIN — POLYETHYLENE GLYCOL (3350) 17 G: 17 POWDER, FOR SOLUTION ORAL at 10:50

## 2018-04-15 RX ADMIN — OXYCODONE HYDROCHLORIDE AND ACETAMINOPHEN 2 TABLET: 5; 325 TABLET ORAL at 14:55

## 2018-04-15 RX ADMIN — SIMETHICONE CHEW TAB 80 MG 80 MG: 80 TABLET ORAL at 12:13

## 2018-04-15 NOTE — PROGRESS NOTES
"S:  I was called to see this patient because of worsening RUQ pain during the night. She is S/P  and bilateral salpingectomy done about 2 days ago. She passed flatus once yesterday morning but none since. No BM. She has been feeling bloated and can feel gas bubbles moving around in her abdomen. Last night she was hurting in both upper quadrants. She took Percocet at about 10 pm and then took a Vicodin about 1 am. She fell asleep but woke up around 4 with RUQ pain (left side doesn't hurt at this time). No nausea or vomiting. No fainting or dizziness when up.  No fever or chills. Moderate lochia.    O:  /90 (BP Location: Left arm)  Pulse 100  Temp 99  F (37.2  C) (Temporal)  Resp 20  Ht 1.651 m (5' 5\")  Wt 82.6 kg (182 lb)  LMP 2017 (LMP Unknown)  SpO2 100%  Breastfeeding? Unknown  BMI 30.29 kg/m2     General:  Doesn't appear to be in acute distress. Pleasant and cooperative.  Lungs: Clear to auscultation.  Back: No CVAT.  Abd: Distended throughout. Soft. Tender to palpation in RUQ and both lower quadrants. No rebound or guarding except in lower quadrants (surgical site). No organomegaly or obvious masses. Uterus is firm and 2 cm below umbilicus. Rare high-pitched bowel sounds.  Incision: Clean, dry, intact.  Extremities: Nontender.    A:  Abdominal pain of uncertain etiology. Most likely due to gas pains from postop ileus.    P:  Follow VS and continue with pain control as needed.  Will check lab work.  Encourage ambulation.  Decrease oral intake.  Further evaluation as indicated.  "

## 2018-04-15 NOTE — PROGRESS NOTES
"Pt reports an increase in abdominal pain at 0350. States it has been progressively become worse and is on the right side. Rates pain a 9/10. Is tearful, and  tender with touch with assessment. Vital signs:  Temp: 99  F (37.2  C) Temp src: Temporal BP: 139/90 Pulse: 100 Heart Rate: 76 Resp: 20 SpO2: 100 %     Height: 165.1 cm (5' 5\") Weight: 82.6 kg (182 lb)  Estimated body mass index is 30.29 kg/(m^2) as calculated from the following:    Height as of this encounter: 1.651 m (5' 5\").    Weight as of this encounter: 82.6 kg (182 lb).  Small bulge noted on right side, body temperature, non discolored, no bruising noted. Bowel sounds minimal, pt states she has passed very few gas bubbles. Reports last bowel movement of Friday early morning. Pt is denying oral pain medication at this time, she is worried that something could be wrong. She was given a simethicone, as this was ordered prn for gas. Dr. Freire notified of pt's concern, at this time he will exam pt at bedside.         "

## 2018-04-15 NOTE — PROGRESS NOTES
S:  Patient received Ibuporfen just over an hour ago. Was able to fall back to sleep. Says her pain is much less right now.    O:  STAT labs reviewed.  CBC RESULTS:   Recent Labs   Lab Test  04/15/18   0515   WBC  16.7*   RBC  4.04   HGB  10.8*   HCT  32.0*   MCV  79   MCH  26.7   MCHC  33.8   RDW  15.0   PLT  299     Recent Labs   Lab Test  04/15/18   0515  08/19/15   1806   NA  135  133*   POTASSIUM  4.2  3.9   CHLORIDE  104  101   CO2  23  24   ANIONGAP  8   --    GLC  100  84   BUN  8  10   CR  0.63  0.55*   JESSICA  9.4  10.0       PE:  Abdomen is less distended and less tender in the upper quadrants.    A:  S/P , postop day #2.    Postop abdominal pain likely due to gas pains from postop ileus.  Labs show elevated WBC and ESR, expected after surgery. Hgb is stable and unchanged over 24 hours. ago. Chemistry panel, lipase, and amylase are normal.    P:  Will decrease diet to full liquids until passing flatus.  Encourage ambulation.  Bowel management including Colace, Simethicone, Dulcolax, etc.

## 2018-04-16 PROCEDURE — 12000027 ZZH R&B OB

## 2018-04-16 PROCEDURE — 25000132 ZZH RX MED GY IP 250 OP 250 PS 637: Performed by: FAMILY MEDICINE

## 2018-04-16 PROCEDURE — 25000132 ZZH RX MED GY IP 250 OP 250 PS 637: Performed by: OBSTETRICS & GYNECOLOGY

## 2018-04-16 PROCEDURE — 40000275 ZZH STATISTIC RCP TIME EA 10 MIN

## 2018-04-16 RX ORDER — BISACODYL 10 MG
10 SUPPOSITORY, RECTAL RECTAL DAILY PRN
Status: DISCONTINUED | OUTPATIENT
Start: 2018-04-16 | End: 2018-04-17 | Stop reason: HOSPADM

## 2018-04-16 RX ORDER — FLUOXETINE 10 MG/1
10 CAPSULE ORAL DAILY
Status: DISCONTINUED | OUTPATIENT
Start: 2018-04-16 | End: 2018-04-17 | Stop reason: HOSPADM

## 2018-04-16 RX ORDER — BISACODYL 10 MG
10 SUPPOSITORY, RECTAL RECTAL
Status: DISCONTINUED | OUTPATIENT
Start: 2018-04-17 | End: 2018-04-17 | Stop reason: HOSPADM

## 2018-04-16 RX ADMIN — OXYCODONE HYDROCHLORIDE AND ACETAMINOPHEN 2 TABLET: 5; 325 TABLET ORAL at 21:13

## 2018-04-16 RX ADMIN — SENNOSIDES 8.6 MG: 8.6 TABLET, FILM COATED ORAL at 08:07

## 2018-04-16 RX ADMIN — IBUPROFEN 800 MG: 400 TABLET ORAL at 02:42

## 2018-04-16 RX ADMIN — DIPHENHYDRAMINE HYDROCHLORIDE 25 MG: 25 CAPSULE ORAL at 16:20

## 2018-04-16 RX ADMIN — OXYCODONE HYDROCHLORIDE AND ACETAMINOPHEN 2 TABLET: 5; 325 TABLET ORAL at 06:05

## 2018-04-16 RX ADMIN — IBUPROFEN 800 MG: 400 TABLET ORAL at 16:19

## 2018-04-16 RX ADMIN — DOCUSATE SODIUM 100 MG: 100 CAPSULE, LIQUID FILLED ORAL at 12:14

## 2018-04-16 RX ADMIN — SENNOSIDES 8.6 MG: 8.6 TABLET, FILM COATED ORAL at 03:00

## 2018-04-16 RX ADMIN — POLYETHYLENE GLYCOL (3350) 17 G: 17 POWDER, FOR SOLUTION ORAL at 08:08

## 2018-04-16 RX ADMIN — FLUOXETINE 10 MG: 10 CAPSULE ORAL at 18:35

## 2018-04-16 RX ADMIN — DOCUSATE SODIUM 100 MG: 100 CAPSULE, LIQUID FILLED ORAL at 21:14

## 2018-04-16 RX ADMIN — SIMETHICONE CHEW TAB 80 MG 80 MG: 80 TABLET ORAL at 16:20

## 2018-04-16 RX ADMIN — OXYCODONE HYDROCHLORIDE AND ACETAMINOPHEN 2 TABLET: 5; 325 TABLET ORAL at 12:14

## 2018-04-16 RX ADMIN — SIMETHICONE CHEW TAB 80 MG 80 MG: 80 TABLET ORAL at 08:12

## 2018-04-16 RX ADMIN — SIMETHICONE CHEW TAB 80 MG 80 MG: 80 TABLET ORAL at 12:14

## 2018-04-16 RX ADMIN — SIMETHICONE CHEW TAB 80 MG 80 MG: 80 TABLET ORAL at 21:14

## 2018-04-16 RX ADMIN — BISACODYL 10 MG: 10 SUPPOSITORY RECTAL at 16:58

## 2018-04-16 RX ADMIN — IBUPROFEN 800 MG: 400 TABLET ORAL at 10:00

## 2018-04-16 NOTE — PROGRESS NOTES
[facility]    Post-Partum Progress Note    Assessment & Plan   Assessment:  Post-partum day #3   section    Continues to have some issues with postoperative ileus (distention and intermittent abdominal pain)    Plan:  Ambulation encouraged  Bowel stimulation  Continue to observe for one more day; possible discharge tomorrow.    Joshua Friere     Interval History   Doing a little better.  Pain is well-controlled although still getting intermittent gas pains.  No fevers.  No history of foul-smelling vaginal discharge. Denies chest pain, shortness of breath, nausea or vomiting.  Vaginal bleeding is similar to a light menstrual flow.  Ambulatory.  Breastfeeding well. Was finally able to pass some flatus yesterday with Dulcolax suppository x  2. No BM yet.    Medications     - MEDICATION INSTRUCTIONS -       - MEDICATION INSTRUCTIONS -       oxytocin in 0.9% NaCl       dextrose 5% lactated ringers Stopped (18 0430)     - MEDICATION INSTRUCTIONS -       - MEDICATION INSTRUCTIONS -       NO Rho (D) immune globulin (RhoGam) needed - mother Rh POSITIVE         simethicone  80 mg Oral 4x Daily     polyethylene glycol  17 g Oral Daily     docusate sodium  100 mg Oral BID       Physical Exam   Temp: 99.1  F (37.3  C) Temp src: Temporal BP: 125/75 Pulse: 99 Heart Rate: 72 Resp: 20        Vitals:    18 0731   Weight: 82.6 kg (182 lb)     Vital Signs with Ranges  Temp:  [97.4  F (36.3  C)-99.1  F (37.3  C)] 99.1  F (37.3  C)  Pulse:  [76-99] 99  Heart Rate:  [72] 72  Resp:  [20] 20  BP: (110-135)/(60-82) 125/75       Incision/Surgical Site 18 Abdomen (Active)   Incision Assessment WDL 2018  2:42 AM   Closure Adhesive strip(s) 2018  2:42 AM   Incision Drainage Amount None 2018  2:42 AM   Dressing Intervention Clean, dry, intact 2018  2:42 AM   Number of days:3     No line/device    Uterine fundus is firm, non-tender and at the level of the umbilicus  Abdomen is  still distended but a little less than yesterday. Decreased bowel sounds but no longer high-pitched.  Incision is clean, dry and intact.  Extremities without calf tenderness.    Data   Recent Labs   Lab Test  04/13/18   0800   ABO  O   RH  Pos   AS  Neg     Recent Labs   Lab Test  04/15/18   0515  04/14/18   0640   HGB  10.8*  10.9*     No lab results found.

## 2018-04-16 NOTE — PROGRESS NOTES
Patient has ambulated in the perez. Rates pain a 7 on scale of 1-10 in her lower rt quad. Able to pass gas and burp. Abd is distended. Administered patient PO pain med, a stool softner and a simethacone tablet. Patient drinking chicken broth at this time.  at the bedside. Patient has breast fed the twins well and followed up with pumped breast milk

## 2018-04-16 NOTE — PLAN OF CARE
Problem: Patient Care Overview  Goal: Plan of Care/Patient Progress Review  Outcome: Therapy, progress toward functional goals as expected  Assessments completed as charted. B/P: 125/75, T: 99.1, P: 99, R: @. Rates pain: 5/10 in abdomen. Voiding without difficulty. Fundus: Midline u/1. Lochia: Light. Activity: normal activity. Infant feeding: Breast feeding going well.     LATCH Score:   Latch: 2 - Good Latch  Audible Swallowin - Spontaneous & frequent  Type of Nipple: (Breast/Nipple) 2 - Everted  Comfort: 2 - Soft, Nontender  Hold: 2 - No Assist   Total LATCH Score: 10    Postpartum breastfeeding assessment completed and education provided, see Patient Education Activity.  Items included in the education are:     proper positioning and latch    effectiveness of feeding    manual expression    handling and storing breastmilk    maintenance of breastfeeding for the first 6 months    sign/symptoms of infant feeding issues requiring referral to qualified health care provider  Postpartum care education provided, see Patient Education activity. Patient denies needs. Will monitor.  Brandy Tabor RN

## 2018-04-16 NOTE — PROGRESS NOTES
Patient has taken a shower and plans to take a nap at this time. Has been drinking warm water to help stimulate her bowels

## 2018-04-16 NOTE — LACTATION NOTE
This note was copied from a baby's chart.  INPATIENT LACTATION CONSULT      Consult with Dafne and jeovany regarding breastfeeding.  Dafne has been attempting to tandem nurse with some success.  Dafne states she notices obvious rooting with a strong latch on both boys during feedings.  Rhythmic and aggressive suckling also noted.  Instructed Dafne on correct positioning and technique when latching babes on.  Dafne is independent with latching babes onto breast individually. Informed Dafne that tandem nursing will get easier with each attempt.  Minimal assistance required with latch.  Encouraged Dafne on the importance of frequent feedings throughout the day (at least 8-12 feedings in a 24 hour period) and skin to skin contact.  Dafne feels like her milk is starting to come in and is able to pump about 1 oz on each side.  Pumping information reviewed with Dafne.  Dafne demonstrates and states she understands all information given and will follow-up with me for an outpatient lactation consult as scheduled for her and the twins.    Concetta Francisco RN, IBCLC  Lactation Consultant  [facility]

## 2018-04-17 VITALS
DIASTOLIC BLOOD PRESSURE: 68 MMHG | HEIGHT: 65 IN | SYSTOLIC BLOOD PRESSURE: 122 MMHG | RESPIRATION RATE: 20 BRPM | HEART RATE: 86 BPM | TEMPERATURE: 98.6 F | BODY MASS INDEX: 30.32 KG/M2 | WEIGHT: 182 LBS | OXYGEN SATURATION: 97 %

## 2018-04-17 PROCEDURE — 25000132 ZZH RX MED GY IP 250 OP 250 PS 637: Performed by: FAMILY MEDICINE

## 2018-04-17 PROCEDURE — 25000132 ZZH RX MED GY IP 250 OP 250 PS 637: Performed by: OBSTETRICS & GYNECOLOGY

## 2018-04-17 PROCEDURE — 99207 ZZC NO CHARGE LOS: CPT | Performed by: OBSTETRICS & GYNECOLOGY

## 2018-04-17 RX ORDER — IBUPROFEN 600 MG/1
600 TABLET, FILM COATED ORAL EVERY 6 HOURS PRN
Qty: 30 TABLET | Refills: 0 | Status: SHIPPED | OUTPATIENT
Start: 2018-04-17 | End: 2018-04-23

## 2018-04-17 RX ORDER — DOCUSATE SODIUM 100 MG/1
100 CAPSULE, LIQUID FILLED ORAL 2 TIMES DAILY
Qty: 60 CAPSULE | Refills: 0 | Status: SHIPPED | OUTPATIENT
Start: 2018-04-17

## 2018-04-17 RX ORDER — OXYCODONE AND ACETAMINOPHEN 5; 325 MG/1; MG/1
1-2 TABLET ORAL EVERY 4 HOURS PRN
Qty: 20 TABLET | Refills: 0 | Status: SHIPPED | OUTPATIENT
Start: 2018-04-17 | End: 2018-04-23

## 2018-04-17 RX ORDER — FLUOXETINE 10 MG/1
10 CAPSULE ORAL DAILY
Qty: 90 CAPSULE | Refills: 1 | Status: SHIPPED | OUTPATIENT
Start: 2018-04-17 | End: 2018-04-30

## 2018-04-17 RX ADMIN — POLYETHYLENE GLYCOL (3350) 17 G: 17 POWDER, FOR SOLUTION ORAL at 10:32

## 2018-04-17 RX ADMIN — DOCUSATE SODIUM 100 MG: 100 CAPSULE, LIQUID FILLED ORAL at 09:23

## 2018-04-17 RX ADMIN — SIMETHICONE CHEW TAB 80 MG 80 MG: 80 TABLET ORAL at 09:25

## 2018-04-17 RX ADMIN — OXYCODONE HYDROCHLORIDE AND ACETAMINOPHEN 2 TABLET: 5; 325 TABLET ORAL at 04:16

## 2018-04-17 RX ADMIN — BISACODYL 10 MG: 10 SUPPOSITORY RECTAL at 12:06

## 2018-04-17 RX ADMIN — IBUPROFEN 800 MG: 400 TABLET ORAL at 00:58

## 2018-04-17 RX ADMIN — OXYCODONE HYDROCHLORIDE AND ACETAMINOPHEN 2 TABLET: 5; 325 TABLET ORAL at 09:23

## 2018-04-17 RX ADMIN — FLUOXETINE 10 MG: 10 CAPSULE ORAL at 10:32

## 2018-04-17 RX ADMIN — IBUPROFEN 600 MG: 600 TABLET ORAL at 12:05

## 2018-04-17 NOTE — PLAN OF CARE
Problem: Breastfeeding (Adult,Obstetrics,Pediatric)  Goal: Signs and Symptoms of Listed Potential Problems Will be Absent, Minimized or Managed (Breastfeeding)  Signs and symptoms of listed potential problems will be absent, minimized or managed by discharge/transition of care (reference Breastfeeding (Adult,Obstetrics,Pediatric) CPG).   Outcome: Therapy, progress toward functional goals as expected  Breast feeding well tandem style with assistance. Able to hold babies in appropriate position during feedings    Comments: Assessments completed as charted. B/P: 122/68, T: 98.6, P: 86, R: @. Rates pain: 3/10 abdomen. Voiding without difficulty. Fundus: Midline. Lochia: Light. Activity: normal activity. Infant feeding: Breast feeding going well with both babies, has been breast feeding separately as well as tandem style.  Both Babies have been feeding well, Pt has been able to maintain feeding schedules for babies. Pt shows confidence with pumping independently and has pumped twice during the night.     LATCH Score:   Latch: 2 - Good Latch  Audible Swallowin - Spontaneous & frequent  Type of Nipple: (Breast/Nipple) 2 - Everted  Comfort: 2 - Soft, Nontender  Hold: 2 - No Assist   Total LATCH Score:  10      Postpartum breastfeeding assessment completed and education provided, see Patient Education Activity.  Items included in the education are:     proper positioning and latch    effectiveness of feeding    manual expression    handling and storing breastmilk    maintenance of breastfeeding for the first 6 months    sign/symptoms of infant feeding issues requiring referral to qualified health care provider  Postpartum care education provided, see Patient Education activity. Patient denies needs. Will monitor.  Brandy Tabor RN

## 2018-04-17 NOTE — PROGRESS NOTES
Patient discharged per ambulatory with infants in car seat. Mother verified that her band matches her infant's band by comparing the infant's  MR#.  Discharge instructions given. Encouraged to call for any problems, questions or concerns. RXs sent home with patient and sent to [facility]. .

## 2018-04-17 NOTE — DISCHARGE SUMMARY
[facility]    Discharge Summary  Obstetrics    Date of Admission:  2018  Date of Discharge:  2018  Discharging Provider: Tu Mejia    Discharge Diagnoses   Desire of Sterilization, History of  section    Procedure/Surgery Information   Procedure: Procedure(s):   Section - Wound Class: II-Clean Contaminated   Surgeon(s): Surgeon(s) and Role:     * Tu Mejia MD - Primary     History of Present Illness   Dafne Altman is a 38 year old female who presented with twin pregnancy with preeclampsia at 37 weeks for repeat  section and tubal ligation.    Hospital Course   The patient's hospital course was notable for postop ileus. By postop day 4 she began passing flatus and her abdomen became less distended. She had also had untreated anxiety and Prozac was started. On discharge, her pain was well controlled. Vaginal bleeding is similar to peak menstrual flow.  Voiding without difficulty.  Ambulating well and tolerating a normal diet.  No fever or significant wound drainage.  Breastfeeding well.  Infant is stable.  She was discharged on post-partum day #3.    Post-partum hemoglobin:   Hemoglobin   Date Value Ref Range Status   04/15/2018 10.8 (L) 11.7 - 15.7 g/dL Final       Tu Mejia    Discharge Disposition   Discharged to home   Condition at discharge: Good    Pending Results   Final pathology results: Pending at time of discharge  These results will be followed up by Tu Mejia MD.  Unresulted Labs Ordered in the Past 30 Days of this Admission     Date and Time Order Name Status Description    2018 0946 Surgical pathology exam In process           Primary Care Physician   FRANKIE PERSON    Consultations This Hospital Stay   HOME CARE POST PARTUM/ IP CONSULT  LACTATION IP CONSULT    Discharge Orders     Activity   Review discharge instructions     Reason for your hospital stay   Maternity care     Follow Up and recommended  labs and tests   Follow up with me,  Tu Mejia, within 6 weeks. for hospital follow- up.  No follow up labs or test are needed.     Discharge Instructions - Postpartum visit   Schedule postpartum visit with your provider and return to clinic in 6 weeks. Also 2 weeks if a  section was done.     Diet   Resume previous diet       Discharge Medications   Current Discharge Medication List      START taking these medications    Details   docusate sodium (COLACE) 100 MG capsule Take 1 capsule (100 mg) by mouth 2 times daily  Qty: 60 capsule, Refills: 0    Associated Diagnoses: S/P  section      ibuprofen (ADVIL/MOTRIN) 600 MG tablet Take 1 tablet (600 mg) by mouth every 6 hours as needed for other (carmping)  Qty: 30 tablet, Refills: 0    Associated Diagnoses: S/P  section      oxyCODONE-acetaminophen (PERCOCET) 5-325 MG per tablet Take 1-2 tablets by mouth every 4 hours as needed for other (pain control or improvement in physical function. Hold dose for analgesic side effects.)  Qty: 20 tablet, Refills: 0    Associated Diagnoses: S/P  section      FLUoxetine (PROZAC) 10 MG capsule Take 1 capsule (10 mg) by mouth daily  Qty: 90 capsule, Refills: 1    Associated Diagnoses: Postpartum anxiety         CONTINUE these medications which have NOT CHANGED    Details   DiphenhydrAMINE HCl (BENADRYL PO) Take by mouth nightly as needed      melatonin 3 MG tablet Take 1 tablet (3 mg) by mouth nightly as needed for sleep      Docosahexaenoic Acid (ALGAL-900 DHA) 450 MG CAPS Take 1 capsule by mouth daily      aluminum chloride (DRYSOL) 20 % external solution Apply topically daily as needed      ferrous sulfate 325 (65 FE) MG TBEC EC tablet Take 1 tablet by mouth daily      folic acid (FOLVITE) 1 MG tablet Take 1 tablet by mouth daily      Prenatal Vit-Fe Fumarate-FA (PRENATAL MULTIVITAMIN PLUS IRON) 27-0.8 MG TABS per tablet Take 1 tablet by mouth daily         STOP taking these medications        azithromycin (ZITHROMAX) 250 MG tablet Comments:   Reason for Stopping:             Allergies   Not on File

## 2018-04-17 NOTE — PROGRESS NOTES
Patient has been up ad mey today caring for self and the twins. Patient has not had to have a BM since she was admitted for her . Abd is distended and has tinkles for bowel sounds in all 4 quads. Dr Mejia has been here to assess pt. New orders received. Patient has been tearful and has expressed that she has bouts of depression. Dr Sibley ordered to begin Prozac after her and Dr Mejia communicated

## 2018-04-17 NOTE — PROGRESS NOTES
[facility]    Obstetrics Post-Op / Progress Note    Assessment & Plan   Assessment:  -4 Days Post-Op  Procedure(s):   Section - Wound Class: II-Clean Contaminated    Doing well.  Clean wound without signs of infection.  Pain well-controlled.    Plan:  Lactation consultation  Discharge later today    Tu Mejia     Interval History   Doing well.  Pain is well-controlled.  No fevers.  No history of wound drainage, warmth or significant erythema.  Good appetite.  Denies chest pain, shortness of breath, nausea or vomiting.  Ambulatory.  Breastfeeding well.    Medications     - MEDICATION INSTRUCTIONS -       - MEDICATION INSTRUCTIONS -       oxytocin in 0.9% NaCl       dextrose 5% lactated ringers Stopped (18 0430)     - MEDICATION INSTRUCTIONS -       - MEDICATION INSTRUCTIONS -       NO Rho (D) immune globulin (RhoGam) needed - mother Rh POSITIVE         bisacodyl  10 mg Rectal BID AC     FLUoxetine  10 mg Oral Daily     simethicone  80 mg Oral 4x Daily     polyethylene glycol  17 g Oral Daily     docusate sodium  100 mg Oral BID       Physical Exam   Temp: 98.6  F (37  C) Temp src: Temporal BP: 122/68 Pulse: 86   Resp: 20 SpO2: 97 %      Vitals:    18 0731   Weight: 82.6 kg (182 lb)     Vital Signs with Ranges  Temp:  [97.9  F (36.6  C)-98.6  F (37  C)] 98.6  F (37  C)  Pulse:  [86-92] 86  Resp:  [18-20] 20  BP: (121-122)/(68-82) 122/68  SpO2:  [97 %-98 %] 97 %       Uterine fundus is firm, non-tender and at the level of the umbilicus  Incision C/D/I  Extremities Non-tender    Data   Recent Labs   Lab Test  18   0800   ABO  O   RH  Pos   AS  Neg     Recent Labs   Lab Test  04/15/18   0515  18   0640   HGB  10.8*  10.9*     No lab results found.

## 2018-04-20 ENCOUNTER — HOSPITAL ENCOUNTER (OUTPATIENT)
Dept: OBGYN | Facility: OTHER | Age: 38
End: 2018-04-20
Attending: FAMILY MEDICINE
Payer: COMMERCIAL

## 2018-04-20 ENCOUNTER — LACTATION ENCOUNTER (OUTPATIENT)
Age: 38
End: 2018-04-20

## 2018-04-20 PROCEDURE — S9443 LACTATION CLASS: HCPCS | Performed by: REGISTERED NURSE

## 2018-04-20 NOTE — LACTATION NOTE
This note was copied from a baby's chart.  Outpatient Lactation Visit    Puneet Altman  [id number]    Consultation Date: 2018     Reason for Lactation Referral: Initial Lactation Consult    Baby's : 2018    Baby's Current Age: 7 day old  Baby's Gestational Age: Gestational Age: 37w0d    Primary Care Provider: Milagro Burton    Presenting Problem (concerns as stated by parent): none    MATERNAL HISTORY   History of Breast Surgery: no  Breast Changes During Pregnancy: no  Breast Feeding History: nursed first son for 6 months  Maternal Meds: daily prenatal vitamin  Pregnancy Complications: none  Anesthesia during labor: spinal    MATERNAL ASSESSMENT    Breast Size: average, symmetrical, soft after feeding and filling prior to feeding  Nipple Appearance - Left: slightly cracked, with signs of healing, education on further healing techniques provided  Nipple Appearance - Right: slightly cracked, with signs of healing, education on further healing techniques provided  Nipple Erectility - Left: erect with stimulation  Nipple Erectility - Right: erect with stimulation  Areolas Compressibility: soft  Nipple Size: average  Special Equipment Used: none  Day mother reports milk came in:  Day 3    INFANT ASSESSMENT    Oral Anatomy  Mouth: normal  Palate: normal  Jaw: normal  Tongue: normal  Frenulum: normal   Digital Suck Exam: root    FEEDING   Feeding Time: aggressively for 20 minutes  Position:  cradle  Effort to Latch: awake and alert, latched easily  Duration of Breast Feeding: Right Breast: 20; Left Breast: 0  Results: excellent breast feed    Volume of Intake:    Birth Weight: 5 lb 12 oz    Hospital discharge weight: 5 lb 6.1 oz    Today's Weight 5 lb 9.7 oz    Total Intake: 1.3 oz  Output: 4-5 soil diapers in last 24 hours, 3-4 wet diapers in last 24 hours    LATCH Score:   Latch: 2 - Good Latch  Audible Swallowin - Spontaneous & frequent  Type of Nipple: (Breast/Nipple) 2 -  Everted  Comfort: 2 - Soft, Nontender  Hold: 2 - No Assist   Total LATCH Score:  10    FEEDING PLAN    Home Feeding Plan: Continue to feed on demand when  elicits feeding cues with deep latch.  Babe should be eating 8-12 times in a 24 hour period.  Exclusivity explained and encouraged in the early weeks to establish breastfeeding and order in milk supply.  Rooming-in encouraged with explanation of the benefits.  Continue to apply expressed breast milk and Lanolin cream to nipples after feedings for healing and comfort.  Postpartum breastfeeding assessment completed and education provided.  Items included in the education are:     proper positioning and latch    effectiveness of feeding    manual expression    handling and storing breastmilk    maintenance of breastfeeding for the first 6 months    sign/symptoms of infant feeding issues requiring referral to qualified health care provider    LACTATION COMMENTS   Deep latch explained for proper positioning of breast in infant's mouth, maximizing milk transfer and comfort.  Reassurance and encouragement provided in regard to mom's concerns about milk supply.  Follow-up support information provided.  Parents plan to keep  Well-Child Check with physician as scheduled for 2 week well child check.      Face-to-face Time: 60 minutes with assessment and education.    Concetta Francisco  2018  2:00 PM

## 2018-04-20 NOTE — LACTATION NOTE
This note was copied from a baby's chart.  Outpatient Lactation Visit    Heriberto Altman  [id number]    Consultation Date: 2018     Reason for Lactation Referral: Initial Lactation Consult    Baby's : 2018    Baby's Current Age: 7 day old  Baby's Gestational Age: Gestational Age: 37w0d    Primary Care Provider: Milagro Burton    Presenting Problem (concerns as stated by parent): none    MATERNAL HISTORY   History of Breast Surgery: no  Breast Changes During Pregnancy: no  Breast Feeding History: nursed first child for 6 months  Maternal Meds: daily prenatal vitamin  Pregnancy Complications: none  Anesthesia during labor: spinal    MATERNAL ASSESSMENT    Breast Size: average, symmetrical, soft after feeding and filling prior to feeding  Nipple Appearance - Left: slightly cracked, with signs of healing, education on further healing techniques provided  Nipple Appearance - Right: slightly cracked, with signs of healing, education on further healing techniques provided  Nipple Erectility - Left: erect with stimulation  Nipple Erectility - Right: erect with stimulation  Areolas Compressibility: soft  Nipple Size: average  Special Equipment Used: none  Day mother reports milk came in:  Day 3    INFANT ASSESSMENT    Oral Anatomy  Mouth: normal  Palate: normal  Jaw: normal  Tongue: normal  Frenulum: normal   Digital Suck Exam: root    FEEDING   Feeding Time: aggressively for 15 minutes  Position:  cradle  Effort to Latch: awake and alert, latched easily  Duration of Breast Feeding: Right Breast: 15; Left Breast: 0  Results: excellent breast feed    Volume of Intake:    Birth Weight: 4 lb 8 oz    Hospital discharge weight: 4 lb 5.3 oz    Today's Weight 4 lb 7.4 oz    Total Intake: 1 oz  Output: 4-5 soil diapers in last 24 hours, 5-6 wet diapers in last 24 hours    LATCH Score:   Latch: 2 - Good Latch  Audible Swallowin - Spontaneous & frequent  Type of Nipple: (Breast/Nipple) 2 -  Everted  Comfort: 2 - Soft, Nontender  Hold: 2 - No Assist   Total LATCH Score:  10    FEEDING PLAN    Home Feeding Plan: Continue to feed on demand when  elicits feeding cues with deep latch.  Babe should be eating 8-12 times in a 24 hour period.  Exclusivity explained and encouraged in the early weeks to establish breastfeeding and order in milk supply.  Rooming-in encouraged with explanation of the benefits.  Continue to apply expressed breast milk and Lanolin cream to nipples after feedings for healing and comfort.  Postpartum breastfeeding assessment completed and education provided.  Items included in the education are:     proper positioning and latch    effectiveness of feeding    manual expression    handling and storing breastmilk    maintenance of breastfeeding for the first 6 months    sign/symptoms of infant feeding issues requiring referral to qualified health care provider    LACTATION COMMENTS   Babe is nursing aggressively and is moving milk efficiently.  Mom states Heriberto's skin coloring looks the same as yesterday.  Instructed Dafne if [place] appears more sluggish and sleepy at the breast or if his coloring worsens, she should bring him in for a repeat bilirubin level.  Deep latch explained for proper positioning of breast in infant's mouth, maximizing milk transfer and comfort.  Reassurance and encouragement provided in regard to mom's concerns about milk supply.  Follow-up support information provided.  Parents plan to keep  Well-Child Check with physician as scheduled for 2 week well child check.      Face-to-face Time: 60 minutes with assessment and education.    Concetta Francisco  2018  1:07 PM

## 2018-04-23 ENCOUNTER — TELEPHONE (OUTPATIENT)
Dept: OBGYN | Facility: OTHER | Age: 38
End: 2018-04-23

## 2018-04-23 DIAGNOSIS — Z98.891 S/P CESAREAN SECTION: ICD-10-CM

## 2018-04-23 RX ORDER — IBUPROFEN 600 MG/1
600 TABLET, FILM COATED ORAL EVERY 6 HOURS PRN
Qty: 30 TABLET | Refills: 0 | Status: SHIPPED | OUTPATIENT
Start: 2018-04-23 | End: 2018-05-24

## 2018-04-23 RX ORDER — OXYCODONE AND ACETAMINOPHEN 5; 325 MG/1; MG/1
1-2 TABLET ORAL EVERY 4 HOURS PRN
Qty: 20 TABLET | Refills: 0 | Status: SHIPPED | OUTPATIENT
Start: 2018-04-23 | End: 2018-04-30

## 2018-04-30 ENCOUNTER — OFFICE VISIT (OUTPATIENT)
Dept: OBGYN | Facility: OTHER | Age: 38
End: 2018-04-30
Attending: OBSTETRICS & GYNECOLOGY
Payer: COMMERCIAL

## 2018-04-30 VITALS
BODY MASS INDEX: 25.96 KG/M2 | DIASTOLIC BLOOD PRESSURE: 82 MMHG | HEART RATE: 82 BPM | SYSTOLIC BLOOD PRESSURE: 128 MMHG | WEIGHT: 156 LBS

## 2018-04-30 DIAGNOSIS — Z98.891 S/P CESAREAN SECTION: Primary | ICD-10-CM

## 2018-04-30 DIAGNOSIS — F41.8 POSTPARTUM ANXIETY: ICD-10-CM

## 2018-04-30 PROBLEM — O30.049 TWIN GESTATION, DICHORIONIC DIAMNIOTIC: Status: RESOLVED | Noted: 2017-01-01 | Resolved: 2018-04-30

## 2018-04-30 PROBLEM — O09.529 HIGH-RISK PREGNANCY, MULTIGRAVIDA OF ADVANCED MATERNAL AGE, ANTEPARTUM: Status: RESOLVED | Noted: 2017-09-19 | Resolved: 2018-04-30

## 2018-04-30 PROCEDURE — 99024 POSTOP FOLLOW-UP VISIT: CPT | Performed by: OBSTETRICS & GYNECOLOGY

## 2018-04-30 RX ORDER — FLUOXETINE 10 MG/1
20 CAPSULE ORAL DAILY
Qty: 90 CAPSULE | Refills: 1 | Status: SHIPPED | OUTPATIENT
Start: 2018-04-30 | End: 2018-08-30

## 2018-04-30 ASSESSMENT — PAIN SCALES - GENERAL: PAINLEVEL: MILD PAIN (3)

## 2018-04-30 NOTE — LETTER
2018       RE: Dafne Altman  [address]  [place] [address]     Dear Colleague,    Thank you for referring your patient, Dafne Altman, to the [facility]. Please see a copy of my visit note below.    Dafne Altman presents todayfor a postop checkup at 2 weeks after repeat     S: Bowels and bladder are functioning normally, no abnormal bleeding or pain. No concerns about the incision(s).    Current Outpatient Prescriptions   Medication     aluminum chloride (DRYSOL) 20 % external solution     DiphenhydrAMINE HCl (BENADRYL PO)     Docosahexaenoic Acid (ALGAL-900 DHA) 450 MG CAPS     docusate sodium (COLACE) 100 MG capsule     ferrous sulfate 325 (65 FE) MG TBEC EC tablet     FLUoxetine (PROZAC) 10 MG capsule     folic acid (FOLVITE) 1 MG tablet     ibuprofen (ADVIL/MOTRIN) 600 MG tablet     melatonin 3 MG tablet     Prenatal Vit-Fe Fumarate-FA (PRENATAL MULTIVITAMIN PLUS IRON) 27-0.8 MG TABS per tablet     No current facility-administered medications for this visit.      No Known Allergies  Past Medical History:   Diagnosis Date     Dichorionic diamniotic twin pregnancy     2017,     Nonspecific reaction to tuberculin skin test without active tuberculosis     No Comments Provided     Other mental disorders complicating the puerperium     5/15/2016     Primary focal hyperhidrosis of axilla     2017     Past Surgical History:   Procedure Laterality Date      SECTION, TUBAL LIGATION, COMBINED N/A 2018    Procedure: COMBINED  SECTION, TUBAL LIGATION;   Section;  Surgeon: Tu Mejia MD;  Location: [facility] OR     COLONOSCOPY      No Comments Provided     OTHER SURGICAL HISTORY      ,CYSTECTOMY,Left,Axillary area     OTHER SURGICAL HISTORY      3/29/2016,[id number], SECTION, LOW TRANSVERSE,recurrent decels       COMPLETE REVIEW OF SYSTEMS: Psych: irritable, depression      O: /82  (BP Location: Right arm)  Pulse 82  Wt 70.8 kg (156 lb)  LMP 07/28/2017 (LMP Unknown)  Breastfeeding? Yes  BMI 25.96 kg/m2 Body mass index is 25.96 kg/(m^2).    EXAM:  Incisions: CDI  Uterus half way to umbilicus.    I/P:  Stable postop  Recheck in one month      Again, thank you for allowing me to participate in the care of your patient.      Sincerely,    Tu Mejia MD

## 2018-04-30 NOTE — PROGRESS NOTES
Dafne Altman presents todayfor a postop checkup at 2 weeks after repeat     S: Bowels and bladder are functioning normally, no abnormal bleeding or pain. No concerns about the incision(s).    Current Outpatient Prescriptions   Medication     aluminum chloride (DRYSOL) 20 % external solution     DiphenhydrAMINE HCl (BENADRYL PO)     Docosahexaenoic Acid (ALGAL-900 DHA) 450 MG CAPS     docusate sodium (COLACE) 100 MG capsule     ferrous sulfate 325 (65 FE) MG TBEC EC tablet     FLUoxetine (PROZAC) 10 MG capsule     folic acid (FOLVITE) 1 MG tablet     ibuprofen (ADVIL/MOTRIN) 600 MG tablet     melatonin 3 MG tablet     Prenatal Vit-Fe Fumarate-FA (PRENATAL MULTIVITAMIN PLUS IRON) 27-0.8 MG TABS per tablet     No current facility-administered medications for this visit.      No Known Allergies  Past Medical History:   Diagnosis Date     Dichorionic diamniotic twin pregnancy     ,     Nonspecific reaction to tuberculin skin test without active tuberculosis     No Comments Provided     Other mental disorders complicating the puerperium     5/15/2016     Primary focal hyperhidrosis of axilla     2017     Past Surgical History:   Procedure Laterality Date      SECTION, TUBAL LIGATION, COMBINED N/A 2018    Procedure: COMBINED  SECTION, TUBAL LIGATION;   Section;  Surgeon: Tu Mejia MD;  Location: [facility] OR     COLONOSCOPY      No Comments Provided     OTHER SURGICAL HISTORY      ,CYSTECTOMY,Left,Axillary area     OTHER SURGICAL HISTORY      3/29/2016,[id number], SECTION, LOW TRANSVERSE,recurrent decels       COMPLETE REVIEW OF SYSTEMS: Psych: irritable, depression      O: /82 (BP Location: Right arm)  Pulse 82  Wt 70.8 kg (156 lb)  LMP 2017 (LMP Unknown)  Breastfeeding? Yes  BMI 25.96 kg/m2 Body mass index is 25.96 kg/(m^2).    EXAM:  Incisions: CDI  Uterus half way to umbilicus.    I/P:  Stable postop  Recheck in one month    Tu Mejia MD  FACOG  1:13 PM 4/30/2018

## 2018-04-30 NOTE — MR AVS SNAPSHOT
After Visit Summary   2018    Dafne Altman    MRN: [id number]           Patient Information     Date Of Birth          1980        Visit Information        Provider Department      2018 12:45 PM Tu Mejia MD [facility]        Today's Diagnoses     S/P  section    -  1    Postpartum anxiety           Follow-ups after your visit        Your next 10 appointments already scheduled     May 24, 2018 12:45 PM CDT   Post Partum with Tu Mejia MD   [facility] ([facility])    [address]  [place] [id number] [phone]              Who to contact     If you have questions or need follow up information about today's clinic visit or your schedule please contact [facility] directly at [phone].  Normal or non-critical lab and imaging results will be communicated to you by Winmedicalhart, letter or phone within 4 business days after the clinic has received the results. If you do not hear from us within 7 days, please contact the clinic through Winmedicalhart or phone. If you have a critical or abnormal lab result, we will notify you by phone as soon as possible.  Submit refill requests through MobileAware or call your pharmacy and they will forward the refill request to us. Please allow 3 business days for your refill to be completed.          Additional Information About Your Visit        MyChart Information     MobileAware gives you secure access to your electronic health record. If you see a primary care provider, you can also send messages to your care team and make appointments. If you have questions, please call your primary care clinic.  If you do not have a primary care provider, please call [phone] and they will assist you.        Care EveryWhere ID     This is your Care EveryWhere ID. This could be used by other organizations to access your [place] medical records  [id number]         Your Vitals Were     Pulse Last Period Breastfeeding? BMI (Body Mass Index)          82 07/28/2017 (LMP Unknown) Yes 25.96 kg/m2         Blood Pressure from Last 3 Encounters:   04/30/18 128/82   04/17/18 122/68   04/12/18 142/82    Weight from Last 3 Encounters:   04/30/18 70.8 kg (156 lb)   04/13/18 82.6 kg (182 lb)   04/12/18 82.7 kg (182 lb 4.8 oz)              Today, you had the following     No orders found for display         Today's Medication Changes          These changes are accurate as of 4/30/18  1:22 PM.  If you have any questions, ask your nurse or doctor.               These medicines have changed or have updated prescriptions.        Dose/Directions    FLUoxetine 10 MG capsule   Commonly known as:  PROzac   This may have changed:  how much to take   Used for:  Postpartum anxiety   Changed by:  Tu Mejia MD        Dose:  20 mg   Take 2 capsules (20 mg) by mouth daily   Quantity:  90 capsule   Refills:  1         Stop taking these medicines if you haven't already. Please contact your care team if you have questions.     oxyCODONE-acetaminophen 5-325 MG per tablet   Commonly known as:  PERCOCET   Stopped by:  Tu Mejia MD                Where to get your medicines      These medications were sent to [facility] [address] [id number]     Phone:  [phone]     FLUoxetine 10 MG capsule                Primary Care Provider Office Phone # Fax #    Milagro C Juan Robbins [phone]       [address] [facility] [address]        Equal Access to Services     [facility]: Hadii varun obregon hadasho Soomaali, waaxda luqadaha, qaybta kaalmada armen ortiz. So [facility] [phone].    ATENCIÓN: Si habla español, tiene a alfonso disposición servicios gratuitos de asistencia lingüística. Llame al [phone].    We comply with applicable federal civil rights  laws and [place] laws. We do not discriminate on the basis of race, color, national origin, age, disability, sex, sexual orientation, or gender identity.            Thank you!     Thank you for choosing [facility]  for your care. Our goal is always to provide you with excellent care. Hearing back from our patients is one way we can continue to improve our services. Please take a few minutes to complete the written survey that you may receive in the mail after your visit with us. Thank you!             Your Updated Medication List - Protect others around you: Learn how to safely use, store and throw away your medicines at www.disposemymeds.org.          This list is accurate as of 18  1:22 PM.  Always use your most recent med list.                   Brand Name Dispense Instructions for use Diagnosis    ALGAL-900  MG Caps   Generic drug:  Docosahexaenoic Acid      Take 1 capsule by mouth daily        aluminum chloride 20 % external solution    DRYSOL     Apply topically daily as needed        BENADRYL PO      Take by mouth nightly as needed        docusate sodium 100 MG capsule    COLACE    60 capsule    Take 1 capsule (100 mg) by mouth 2 times daily    S/P  section       ferrous sulfate 325 (65 Fe) MG Tbec EC tablet      Take 1 tablet by mouth daily        FLUoxetine 10 MG capsule    PROzac    90 capsule    Take 2 capsules (20 mg) by mouth daily    Postpartum anxiety       folic acid 1 MG tablet    FOLVITE     Take 1 tablet by mouth daily        ibuprofen 600 MG tablet    ADVIL/MOTRIN    30 tablet    Take 1 tablet (600 mg) by mouth every 6 hours as needed for other (carmping)    S/P  section       melatonin 3 MG tablet      Take 1 tablet (3 mg) by mouth nightly as needed for sleep        prenatal multivitamin plus iron 27-0.8 MG Tabs per tablet      Take 1 tablet by mouth daily

## 2018-05-24 ENCOUNTER — PRENATAL OFFICE VISIT (OUTPATIENT)
Dept: OBGYN | Facility: OTHER | Age: 38
End: 2018-05-24
Attending: OBSTETRICS & GYNECOLOGY
Payer: COMMERCIAL

## 2018-05-24 VITALS
WEIGHT: 162.9 LBS | HEART RATE: 74 BPM | DIASTOLIC BLOOD PRESSURE: 68 MMHG | BODY MASS INDEX: 27.11 KG/M2 | SYSTOLIC BLOOD PRESSURE: 114 MMHG

## 2018-05-24 PROCEDURE — 99207 ZZC POST-PARTUM 6 WK VISIT - GICH ONLY: CPT | Performed by: OBSTETRICS & GYNECOLOGY

## 2018-05-24 ASSESSMENT — PAIN SCALES - GENERAL: PAINLEVEL: NO PAIN (0)

## 2018-05-24 NOTE — PROGRESS NOTES
CC: postpartum exam at 6 weeks from delivery    HPI: Dafne Altman presents for postpartum exam. Babies were delivered by  delivery. Complications included , none.  Mood: OK    Breastfeeding: yes  Incision(s): OK  Bleeding: no  Birthcontrol: salpingectomy    Past Medical History:   Diagnosis Date     Dichorionic diamniotic twin pregnancy     2017,     Nonspecific reaction to tuberculin skin test without active tuberculosis     No Comments Provided     Other mental disorders complicating the puerperium     5/15/2016     Primary focal hyperhidrosis of axilla     2017     Past Surgical History:   Procedure Laterality Date      SECTION, TUBAL LIGATION, COMBINED N/A 2018    Procedure: COMBINED  SECTION, TUBAL LIGATION;   Section;  Surgeon: Tu Mejia MD;  Location: [facility] OR     COLONOSCOPY      No Comments Provided     OTHER SURGICAL HISTORY      ,CYSTECTOMY,Left,Axillary area     OTHER SURGICAL HISTORY      3/29/2016,[id number], SECTION, LOW TRANSVERSE,recurrent decels     Current Outpatient Prescriptions   Medication     aluminum chloride (DRYSOL) 20 % external solution     DiphenhydrAMINE HCl (BENADRYL PO)     Docosahexaenoic Acid (ALGAL-900 DHA) 450 MG CAPS     docusate sodium (COLACE) 100 MG capsule     ferrous sulfate 325 (65 FE) MG TBEC EC tablet     FLUoxetine (PROZAC) 10 MG capsule     folic acid (FOLVITE) 1 MG tablet     Prenatal Vit-Fe Fumarate-FA (PRENATAL MULTIVITAMIN PLUS IRON) 27-0.8 MG TABS per tablet     No current facility-administered medications for this visit.       No Known Allergies    REVIEW OF SYSTEMS  General: negative  CV: negative  GI: negative  : negative    O: /68 (BP Location: Right arm)  Pulse 74  Wt 73.9 kg (162 lb 14.4 oz)  Breastfeeding? Yes  BMI 27.11 kg/m2  Body mass index is 27.11 kg/(m^2).    Exam:  Constitutional: healthy, alert and no distress  Incision CDI  Pelvic exam: normal vagina and vulva, normal cervix  without lesions or tenderness, uterus normal size anteverted, adenxa normal in size without tenderness, pap smear done, exam chaperoned by nurse.    PHQ-9 score:    PHQ-9 SCORE 5/24/2018   Total Score 9       Results for orders placed or performed during the hospital encounter of 04/13/18   Anti Treponema   Result Value Ref Range    Treponema pallidum Antibody Negative NEG^Negative   CBC with platelets differential   Result Value Ref Range    WBC 11.4 (H) 4.0 - 11.0 10e9/L    RBC Count 4.60 3.8 - 5.2 10e12/L    Hemoglobin 12.3 11.7 - 15.7 g/dL    Hematocrit 36.8 35.0 - 47.0 %    MCV 80 78 - 100 fl    MCH 26.7 26.5 - 33.0 pg    MCHC 33.4 31.5 - 36.5 g/dL    RDW 15.2 (H) 10.0 - 15.0 %    Platelet Count 295 150 - 450 10e9/L    Diff Method Automated Method     % Neutrophils 75.6 %    % Lymphocytes 17.2 %    % Monocytes 5.0 %    % Eosinophils 1.2 %    % Basophils 0.4 %    % Immature Granulocytes 0.6 %    Absolute Neutrophil 8.6 (H) 1.6 - 8.3 10e9/L    Absolute Lymphocytes 2.0 0.8 - 5.3 10e9/L    Absolute Monocytes 0.6 0.0 - 1.3 10e9/L    Absolute Eosinophils 0.1 0.0 - 0.7 10e9/L    Absolute Basophils 0.1 0.0 - 0.2 10e9/L    Abs Immature Granulocytes 0.1 0 - 0.4 10e9/L   Hemoglobin   Result Value Ref Range    Hemoglobin 10.9 (L) 11.7 - 15.7 g/dL   CBC with platelets differential   Result Value Ref Range    WBC 16.7 (H) 4.0 - 11.0 10e9/L    RBC Count 4.04 3.8 - 5.2 10e12/L    Hemoglobin 10.8 (L) 11.7 - 15.7 g/dL    Hematocrit 32.0 (L) 35.0 - 47.0 %    MCV 79 78 - 100 fl    MCH 26.7 26.5 - 33.0 pg    MCHC 33.8 31.5 - 36.5 g/dL    RDW 15.0 10.0 - 15.0 %    Platelet Count 299 150 - 450 10e9/L    Diff Method Automated Method     % Neutrophils 85.9 %    % Lymphocytes 8.4 %    % Monocytes 4.3 %    % Eosinophils 0.8 %    % Basophils 0.2 %    % Immature Granulocytes 0.4 %    Absolute Neutrophil 14.3 (H) 1.6 - 8.3 10e9/L    Absolute Lymphocytes 1.4 0.8 - 5.3 10e9/L    Absolute Monocytes 0.7 0.0 - 1.3 10e9/L    Absolute Eosinophils  0.1 0.0 - 0.7 10e9/L    Absolute Basophils 0.0 0.0 - 0.2 10e9/L    Abs Immature Granulocytes 0.1 0 - 0.4 10e9/L   Comprehensive metabolic panel   Result Value Ref Range    Sodium 135 134 - 144 mmol/L    Potassium 4.2 3.5 - 5.1 mmol/L    Chloride 104 98 - 107 mmol/L    Carbon Dioxide 23 21 - 31 mmol/L    Anion Gap 8 3 - 14 mmol/L    Glucose 100 70 - 105 mg/dL    Urea Nitrogen 8 7 - 25 mg/dL    Creatinine 0.63 0.60 - 1.20 mg/dL    GFR Estimate >90 >60 mL/min/1.7m2    GFR Estimate If Black >90 >60 mL/min/1.7m2    Calcium 9.4 8.6 - 10.3 mg/dL    Bilirubin Total 0.2 (L) 0.3 - 1.0 mg/dL    Albumin 3.0 (L) 3.5 - 5.7 g/dL    Protein Total 6.4 6.4 - 8.9 g/dL    Alkaline Phosphatase 116 (H) 34 - 104 U/L    ALT 19 7 - 52 U/L    AST 26 13 - 39 U/L   Lipase   Result Value Ref Range    Lipase 12 11 - 82 U/L   Amylase   Result Value Ref Range    Amylase 40 29 - 103 U/L   Erythrocyte sedimentation rate auto   Result Value Ref Range    Sed Rate 103 (H) 1 - 15 mm/h   ABO/Rh type and screen   Result Value Ref Range    ABO O     RH(D) Pos     Antibody Screen Neg     Test Valid Only At [facility]        Specimen Expires 04/16/2018          I/P:  Postpartum visit.    Resume annual preventive healthcare. Continue PNV's until done with childbearing.    RTC prn    Tu Mejia MD FACOG  2:02 PM 5/24/2018

## 2018-05-24 NOTE — MR AVS SNAPSHOT
After Visit Summary   5/24/2018    Dafne Altman    MRN: [id number]           Patient Information     Date Of Birth          1980        Visit Information        Provider Department      5/24/2018 12:45 PM Tu Mejia MD [facility]        Today's Diagnoses     Routine postpartum follow-up    -  1       Follow-ups after your visit        Follow-up notes from your care team     Return if symptoms worsen or fail to improve.      Who to contact     If you have questions or need follow up information about today's clinic visit or your schedule please contact [facility] \[facility] [place] [facility]\"" directly at [phone].  Normal or non-critical lab and imaging results will be communicated to you by Mocavohart, letter or phone within 4 business days after the clinic has received the results. If you do not hear from us within 7 days, please contact the clinic through [employer] or phone. If you have a critical or abnormal lab result, we will notify you by phone as soon as possible.  Submit refill requests through [employer] or call your pharmacy and they will forward the refill request to us. Please allow 3 business days for your refill to be completed.          Additional Information About Your Visit        MyChart Information     [employer] gives you secure access to your electronic health record. If you see a primary care provider, you can also send messages to your care team and make appointments. If you have questions, please call your primary care clinic.  If you do not have a primary care provider, please call [phone] and they will assist you.        Care EveryWhere ID     This is your Care EveryWhere ID. This could be used by other organizations to access your [facility] medical records  [id number]        Your Vitals Were     Pulse Breastfeeding? BMI (Body Mass Index)             74 Yes 27.11 kg/m2          Blood Pressure from Last 3 Encounters:   05/24/18 114/68   04/30/18 128/82    04/17/18 122/68    Weight from Last 3 Encounters:   05/24/18 73.9 kg (162 lb 14.4 oz)   04/30/18 70.8 kg (156 lb)   04/13/18 82.6 kg (182 lb)              Today, you had the following     No orders found for display         Today's Medication Changes          These changes are accurate as of 5/24/18  2:06 PM.  If you have any questions, ask your nurse or doctor.               Stop taking these medicines if you haven't already. Please contact your care team if you have questions.     ibuprofen 600 MG tablet   Commonly known as:  ADVIL/MOTRIN   Stopped by:  uT Mejia MD           melatonin 3 MG tablet   Stopped by:  Tu Mejia MD                    Primary Care Provider Office Phone # Fax #    Milagro MATHIS Juan Robbins [phone]       [address] [facility] [address]        Equal Access to Services     [facility]: Hadii varun obregon hadasho [place], waaxda luqadaha, qaybta kaalmada adeegyada, armen muniz . So [facility] [phone].    ATENCIÓN: Si habla español, tiene a alfonso disposición servicios gratuitos de asistencia lingüística. Alie al [phone].    We comply with applicable federal civil rights laws and [place] laws. We do not discriminate on the basis of race, color, national origin, age, disability, sex, sexual orientation, or gender identity.            Thank you!     Thank you for choosing [facility] [place]  for your care. Our goal is always to provide you with excellent care. Hearing back from our patients is one way we can continue to improve our services. Please take a few minutes to complete the written survey that you may receive in the mail after your visit with us. Thank you!             Your Updated Medication List - Protect others around you: Learn how to safely use, store and throw away your medicines at www.disposemymeds.org.          This list is accurate as of 5/24/18  2:06 PM.  Always use your most recent med  list.                   Brand Name Dispense Instructions for use Diagnosis    ALGAL-900  MG Caps   Generic drug:  Docosahexaenoic Acid      Take 1 capsule by mouth daily        aluminum chloride 20 % external solution    DRYSOL     Apply topically daily as needed        BENADRYL PO      Take by mouth nightly as needed        docusate sodium 100 MG capsule    COLACE    60 capsule    Take 1 capsule (100 mg) by mouth 2 times daily    S/P  section       ferrous sulfate 325 (65 Fe) MG Tbec EC tablet      Take 1 tablet by mouth daily        FLUoxetine 10 MG capsule    PROzac    90 capsule    Take 2 capsules (20 mg) by mouth daily    Postpartum anxiety       folic acid 1 MG tablet    FOLVITE     Take 1 tablet by mouth daily        prenatal multivitamin plus iron 27-0.8 MG Tabs per tablet      Take 1 tablet by mouth daily

## 2018-05-25 ASSESSMENT — PATIENT HEALTH QUESTIONNAIRE - PHQ9: SUM OF ALL RESPONSES TO PHQ QUESTIONS 1-9: 9

## 2018-08-30 DIAGNOSIS — F41.8 POSTPARTUM ANXIETY: ICD-10-CM

## 2018-08-30 RX ORDER — FLUOXETINE 10 MG/1
CAPSULE ORAL
Qty: 90 CAPSULE | Refills: 11 | Status: SHIPPED | OUTPATIENT
Start: 2018-08-30

## 2018-08-30 NOTE — TELEPHONE ENCOUNTER
[facility] pharmacy and pt request a Rx refill request for fluoxetine (Prozac).  Pt's last exam with Dr. YUN Mejia was on 5-24-18.  SSRIs Protocol Failed due to no positive pregnancy test in last 12 months.  Discussed Rx refill request with Dr. YUN Mejia.  Rx approved per Dr. YUN Mejia.  Lisseth Vargas RN on 8/30/2018 at 8:39 AM

## 2018-11-13 ENCOUNTER — HEALTH MAINTENANCE LETTER (OUTPATIENT)
Age: 38
End: 2018-11-13

## 2020-03-11 ENCOUNTER — HEALTH MAINTENANCE LETTER (OUTPATIENT)
Age: 40
End: 2020-03-11

## 2020-12-27 ENCOUNTER — HEALTH MAINTENANCE LETTER (OUTPATIENT)
Age: 40
End: 2020-12-27

## 2021-04-25 ENCOUNTER — HEALTH MAINTENANCE LETTER (OUTPATIENT)
Age: 41
End: 2021-04-25

## 2021-10-09 ENCOUNTER — HEALTH MAINTENANCE LETTER (OUTPATIENT)
Age: 41
End: 2021-10-09

## 2022-05-21 ENCOUNTER — HEALTH MAINTENANCE LETTER (OUTPATIENT)
Age: 42
End: 2022-05-21

## 2022-07-27 NOTE — NURSING NOTE
Noted. Patient presents to the clinic for her OB visit. She is 28w3d. Two baby steps given.  Prosper Ribeiro ..............2/12/2018 3:11 PM

## 2022-09-17 ENCOUNTER — HEALTH MAINTENANCE LETTER (OUTPATIENT)
Age: 42
End: 2022-09-17

## 2023-06-04 ENCOUNTER — HEALTH MAINTENANCE LETTER (OUTPATIENT)
Age: 43
End: 2023-06-04

## 2023-11-17 NOTE — PROGRESS NOTES
"Subjective   Patient ID [id number]   Zully Hodges \"Alex" is a 29 y.o.  at 13w6d with a working estimated date of delivery of 2024, by Last Menstrual Period who presents for a routine prenatal visit. She denies vaginal bleeding and abdominal pain.      Objective   Physical Exam:     Constitutional: Alert and oriented, cooperative, no acute distress, well nourished, well hydrated       HEENT: normal       Chest: lungs clear heart RRR        Abd: soft NT no cvat, uterus midway to umbilicus unable to hear FHT          Peripheral Vascular: no edema or varicosities      Neurologic: normal tone and sensation     Neuropsych: normal affect, well groomed, good eye contact           , Pregravid BMI: 26.58  Expected Total Weight Gain: 7 kg (15 lb)-11.5 kg (25 lb)               Assessment/Plan   Sent for ultrasound for viability  Continue prenatal vitamin.  OB labs today  Genetic testing offered desires NIPT  Encouraged proper nutrition and exercise  Follow up in 4 weeks for a routine prenatal visit.    " Patient Information     Patient Name MRN Sex     Dafne Altman [id number] Female 1980      Progress Notes by Milagro Sibley MD at 2017  2:45 PM     Author:  Milagro Sibley MD Service:  (none) Author Type:  Physician     Filed:  2017  3:48 PM Encounter Date:  2017 Status:  Signed     :  Milagro Sibley MD (Physician)            PRENATAL EXAM - OB    Dafne Altman is a 37 y.o. female, G 2, P 1, here today for a prenatal exam.    This was unplanned pregnancy.  Was not done birth control.  First pregnancy was complicated by gestational hypertension without preeclampsia. Patient underwent  section for fetal intolerance of labor. Her plan would be to have another  with tubal ligation.  She breast-fed her first child for 4 months. Postpartum course, Sulema of by postpartum depression and anxiety.  Currently, she has some mild fatigue and increased anxiety. No vaginal bleeding or spotting. No cramping. She's had 2 positive home pregnancy test.  LMP:  Patient's last menstrual period was 2017.  First trimester ultrasound:  needed     Marital Status:   Occupation:  Full time from home    [place]'s Provider:  Milagro Sibley MD      Education (last grade completed):  college    /Father of baby:  González      MENSTRUAL HISTORY  LMP:  Patient's last menstrual period was 2017.  Cycle Regularity:  regular, every 28-30 days  Flow:  moderate    ELFEGO by LMP Calculator:  18  Date Reliability:  definite  On BCP's/other birth control at conception: no     MEDICAL HISTORY  OB History                    Para  Term     AB  Living     2   1  1       1     SAB   TAB  Ectopic  Multiple   Live Births                 1                        # Outcome Date  GA  Lbr Frank/2nd  Weight Sex  Delivery Anes PTL Lv   2 Current                1 Term 16  40w0d    3.51 kg (7 lb 11.8 oz) M   SPINAL  DANNY       "Complications: Fetal Intolerance                                      Past Medical History:     Diagnosis  Date     Hyperhidrosis of axilla 2017     Postpartum anxiety 5/15/2016     PPD positive, treated      Pregnancy, high-risk             Past Surgical History:      Procedure  Laterality Date      SECTION, LOW TRANSVERSE  3/29/2016    recurrent decels       COLONOSCOPY       CYSTECTOMY Left     Axillary area         Family History:  Family History       Problem   Relation Age of Onset     Diabetes  Mother      Hypertension  Father      Other  Sister      PCOS       Diabetes  Sister      Diabetes  Sister        Social History:  Social History       Substance Use Topics         Smoking status:   Never Smoker     Smokeless tobacco:   Never Used     Alcohol use   2.4 oz/week     4 Standard drinks or equivalent per week        Comment: often in single episode        RISK FACTORS  Exercise Times/wk:  Not on treadmill now  Seat Belt Use: 100%  Alcohol/day: 0  Meds/Drugs/ETOH Since LMP:  No  Birth Control Method: none  High Risk Behavior: none    INFECTION HISTORY  Current Drug Use:  none  HIV Risk Evaluation:  low risk  Hepatitis B Risk Evaluation: low risk  Hepatitis B Immunized: yes  TB Exposure: no    Personal History of Genital Herpes: no  Partner History of Genital Herpes: no  Rash/Viral Illness Since LMP: No  History of STD: no history of STD's  Other:  gbs was negative     REVIEW OF SYSTEMS   No constipation at this time.  Past history of .  Declines AMA/ risk testing.      PHYSICAL EXAM  /80  Pulse 76  Ht 1.651 m (5' 5\")  Wt 70.9 kg (156 lb 3.2 oz)  LMP 2017  Breastfeeding? No  BMI 25.99 kg/m2  General Appearance:  Alert, appropriate appearance for age. No acute distress  HEENT Exam:  Orbital prom with previous evaluation   Neck / Thyroid Exam:  Supple, no masses, nodes or enlargement.  Chest/Respiratory Exam: Normal chest wall and respirations. Clear to " auscultation.  Breast Exam: No dimpling, nipple retraction or discharge. No masses or nodes.  Cardiovascular Exam: Regular rate and rhythm. S1, S2, no murmur, click, gallop, or rubs.  Gastrointestinal Exam: Soft, non-tender, no masses or organomegaly.  Pelvic Exam Female: Vulva and vagina appear normal. Bimanual exam reveals 8 week sized uterus  Musculoskeletal Exam: Back is straight and non-tender, full ROM of upper and lower extremities.  Skin: no rash or abnormalities  Neurologic Exam: reflexes normal   Psychiatric Exam: Alert and oriented, appropriate affect.    ASSESSMENT  1. High-risk pregnancy, elderly multigravida, unspecified trimester    2. History of  section          PLAN:  PN labs obtained today.  Ultrasound is ordered.  Discussed AMA options as listed below - pt declines all of these.    Additional screening options offered to mothers who will be 35 years old or more at time of delivery.      SCREENING OPTIONS:  Level II ultrasound is performed at approximately 20 weeks of pregnancy. This detailed exam allows for an evaluation of fetal growth and anatomy. This ultrasound can identify most major structural birth defects. The risk for a birth defect is 3-4% in the general population.  In addition, level II ultrasound can be used to identify markers or symptoms that are associated with chromosome abnormalities, such as Down syndrome, trisomy 13, or trisomy 18. Approximately half of pregnancies with Down syndrome and closer to 90% of pregnancies with trisomy 13 or 18 will have a marker or birth defect on ultrasound.  A normal level II ultrasound will reduce, but not eliminate, the risk for a chromosomal abnormality. If markers or birth defects are identified by ultrasound, further screening and/or testing may be indicated.     Cell free fetal DNA is extracted from a maternal blood sample and is analyzed for overrepresentation of material from chromosomes 13, 18, 21, X and Y.  If there is an over-  or under-representation of material from one of these chromosomes, a trisomy or monosomy is suspected.  The detection rate, or ability of the test to determine DNA material is from a specific chromosome, is high for trisomy 13, 18 and 21.  However, it was also noted that while detection rates are often quoted at 99% or higher, this does not equate to the screens ability to detect an affected pregnancy.  The positive predictive value (PPV), or likelihood that a positive test equates to the pregnancy being affected with that condition, depends upon several factors including a mother's age-related risk, and the sensitivity and specificity of the screen for that particular condition.  There are both false positive and false negative results.  In addition, in few cases, a result may not be obtained.  As with a positive result on any prenatal screening test, confirmatory testing via CVS or amniocentesis is recommended with a positive result on cell free fetal DNA screening.  Results are expected in approximately 1-2 weeks.        TESTING OPTIONS:  Amniocentesis is a diagnostic test, available as early as week 15 of pregnancy. Using a thin needle, a small amount of amniotic fluid (containing fetal skin cells and a chemical marker called AFP) is obtained for analysis.  The accuracy of amniocentesis is greater than 99% for chromosome abnormalities and 98% for open neural tube defects. Complications associated with amniocentesis can include vaginal bleeding, cramping, fluid leakage, infection, and pregnancy loss. The risk of miscarriage following amniocentesis is 1 in 300 to 1 in 500. When this procedure is done after 24 weeks gestation, it is associated with a risk of 1 in 200 or less for pre-term labor. Amniocentesis does not test for all causes of mental retardation or birth defects. Results typically take 10-14 days.     Nursing risk and history form reviewed.    Reviewed health maintenance issues including diet,  exercise, caffeine intake, mood symptoms, daily vitamins, child birth classes, and regular prenatal exams.  New ob folder with pt education information has been given.  She was encouraged to call the office with any questions or concerns.  Follow up in a month.      Milagro Sibley MD ....................  9/19/2017   3:04 PM

## 2024-02-24 ENCOUNTER — HEALTH MAINTENANCE LETTER (OUTPATIENT)
Age: 44
End: 2024-02-24

## (undated) DEVICE — SU PDS II 0 CTX 60" Z990G

## (undated) DEVICE — LIGHT HANDLES PLASTIC

## (undated) DEVICE — SU CHROMIC 1 CTX 36" 905H

## (undated) DEVICE — SU VICRYL 2-0 SH 27" UND J417H

## (undated) DEVICE — DRSG STERI STRIP 1/2X4" R1547

## (undated) DEVICE — PREP CHLORAPREP 26ML TINTED ORANGE  260815

## (undated) DEVICE — GLOVE PROTEXIS POWDER FREE SMT 7.5  2D72PT75X

## (undated) DEVICE — Device

## (undated) DEVICE — TRAY 16FR CATH W/URINE METER SILVER 903116

## (undated) DEVICE — SU VICRYL 0 CTX 36" J370H

## (undated) DEVICE — PAD CHUX UNDERPAD 30X36" P3036C

## (undated) DEVICE — SYR 10ML FINGER CONTROL W/O NDL 309695

## (undated) DEVICE — ESU GROUND PAD ADULT W/CORD E7507

## (undated) DEVICE — SU VICRYL 3-0 CT 36" J356H

## (undated) DEVICE — SLEEVE COMPRESSION SCD KNEE MED 74022

## (undated) DEVICE — SU PLAIN 0 TIE 54" S104H

## (undated) DEVICE — FASTENER LEGBAND CATHETER DALE 316

## (undated) DEVICE — SOL WATER 1500ML

## (undated) DEVICE — DRSG MEDIPORE 3 1/2X8" 3570

## (undated) DEVICE — PACK C SECTION SMA15CSFCA

## (undated) DEVICE — SU MONOCRYL 3-0 PS-2 18" UND Y497G

## (undated) DEVICE — NDL SPINAL 20GA 3.5" 405182

## (undated) DEVICE — SU VICRYL 2-0 CT-1 36" UND J945H

## (undated) RX ORDER — SODIUM CHLORIDE, SODIUM LACTATE, POTASSIUM CHLORIDE, CALCIUM CHLORIDE 600; 310; 30; 20 MG/100ML; MG/100ML; MG/100ML; MG/100ML
INJECTION, SOLUTION INTRAVENOUS
Status: DISPENSED
Start: 2018-04-13

## (undated) RX ORDER — MORPHINE SULFATE 1 MG/ML
INJECTION, SOLUTION EPIDURAL; INTRATHECAL; INTRAVENOUS
Status: DISPENSED
Start: 2018-04-13

## (undated) RX ORDER — BUPIVACAINE HYDROCHLORIDE 7.5 MG/ML
INJECTION, SOLUTION INTRASPINAL
Status: DISPENSED
Start: 2018-04-13

## (undated) RX ORDER — ACETAMINOPHEN 10 MG/ML
INJECTION, SOLUTION INTRAVENOUS
Status: DISPENSED
Start: 2018-04-13

## (undated) RX ORDER — SODIUM CHLORIDE 9 MG/ML
INJECTION, SOLUTION INTRAVENOUS
Status: DISPENSED
Start: 2018-04-13

## (undated) RX ORDER — OXYTOCIN 10 [USP'U]/ML
INJECTION, SOLUTION INTRAMUSCULAR; INTRAVENOUS
Status: DISPENSED
Start: 2018-04-13

## (undated) RX ORDER — FENTANYL CITRATE 50 UG/ML
INJECTION, SOLUTION INTRAMUSCULAR; INTRAVENOUS
Status: DISPENSED
Start: 2018-04-13

## (undated) RX ORDER — KETOROLAC TROMETHAMINE 30 MG/ML
INJECTION, SOLUTION INTRAMUSCULAR; INTRAVENOUS
Status: DISPENSED
Start: 2018-04-13

## (undated) RX ORDER — LIDOCAINE HYDROCHLORIDE 20 MG/ML
INJECTION, SOLUTION EPIDURAL; INFILTRATION; INTRACAUDAL; PERINEURAL
Status: DISPENSED
Start: 2018-04-13

## (undated) RX ORDER — BUPIVACAINE HYDROCHLORIDE 2.5 MG/ML
INJECTION, SOLUTION EPIDURAL; INFILTRATION; INTRACAUDAL
Status: DISPENSED
Start: 2018-04-13

## (undated) RX ORDER — ONDANSETRON 2 MG/ML
INJECTION INTRAMUSCULAR; INTRAVENOUS
Status: DISPENSED
Start: 2018-04-13

## (undated) RX ORDER — CEFAZOLIN SODIUM 2 G/100ML
INJECTION, SOLUTION INTRAVENOUS
Status: DISPENSED
Start: 2018-04-13